# Patient Record
Sex: MALE | Race: WHITE | Employment: FULL TIME | URBAN - METROPOLITAN AREA
[De-identification: names, ages, dates, MRNs, and addresses within clinical notes are randomized per-mention and may not be internally consistent; named-entity substitution may affect disease eponyms.]

---

## 2017-02-10 ENCOUNTER — HOSPITAL ENCOUNTER (OUTPATIENT)
Dept: LAB | Age: 52
Discharge: HOME | End: 2017-02-10
Payer: COMMERCIAL

## 2017-02-10 ENCOUNTER — OFFICE VISIT (OUTPATIENT)
Dept: FAMILY MEDICINE CLINIC | Age: 52
End: 2017-02-10

## 2017-02-10 VITALS
BODY MASS INDEX: 32.39 KG/M2 | RESPIRATION RATE: 16 BRPM | DIASTOLIC BLOOD PRESSURE: 81 MMHG | SYSTOLIC BLOOD PRESSURE: 137 MMHG | HEIGHT: 65 IN | WEIGHT: 194.4 LBS | OXYGEN SATURATION: 99 % | TEMPERATURE: 98.2 F | HEART RATE: 62 BPM

## 2017-02-10 DIAGNOSIS — E78.2 MIXED HYPERLIPIDEMIA: Primary | ICD-10-CM

## 2017-02-10 DIAGNOSIS — E55.9 VITAMIN D DEFICIENCY: ICD-10-CM

## 2017-02-10 DIAGNOSIS — L24.9 IRRITANT CONTACT DERMATITIS, UNSPECIFIED TRIGGER: ICD-10-CM

## 2017-02-10 DIAGNOSIS — E78.2 MIXED HYPERLIPIDEMIA: ICD-10-CM

## 2017-02-10 LAB
CHOLEST SERPL-MCNC: 205 MG/DL
HDLC SERPL-MCNC: 39 MG/DL (ref 40–60)
HDLC SERPL: 5.3 {RATIO} (ref 0–5)
LDLC SERPL CALC-MCNC: 101.6 MG/DL (ref 0–100)
LIPID PROFILE,FLP: ABNORMAL
TRIGL SERPL-MCNC: 322 MG/DL (ref ?–150)
VLDLC SERPL CALC-MCNC: 64.4 MG/DL

## 2017-02-10 PROCEDURE — 80061 LIPID PANEL: CPT | Performed by: PHYSICIAN ASSISTANT

## 2017-02-10 PROCEDURE — 36415 COLL VENOUS BLD VENIPUNCTURE: CPT | Performed by: PHYSICIAN ASSISTANT

## 2017-02-10 PROCEDURE — 82306 VITAMIN D 25 HYDROXY: CPT | Performed by: PHYSICIAN ASSISTANT

## 2017-02-10 RX ORDER — CLOTRIMAZOLE AND BETAMETHASONE DIPROPIONATE 10; .64 MG/G; MG/G
CREAM TOPICAL
Qty: 15 G | Refills: 0 | Status: SHIPPED | OUTPATIENT
Start: 2017-02-10 | End: 2019-02-13 | Stop reason: SDUPTHER

## 2017-02-10 RX ORDER — ATORVASTATIN CALCIUM 20 MG/1
20 TABLET, FILM COATED ORAL DAILY
Qty: 30 TAB | Refills: 0 | Status: SHIPPED | OUTPATIENT
Start: 2017-02-10 | End: 2017-03-08 | Stop reason: SDUPTHER

## 2017-02-10 NOTE — PROGRESS NOTES
HISTORY OF PRESENT ILLNESS  Mario Borrero is a 54 y.o. male. HPI Comments: Pt comes in today for f/u dizziness, HLP, Vit D. He says that he has only had 3 episodes of dizziness in the past 3 months. He says that he has not been able to check his BS bc his machine is broken but he says that when he feels LH, he will take a glucose tab and feel better. He says that he still does not eat breakfast regualrly but is trying to snack more often. HE says that he has not been taking his Lipitor daily bc he forgets some days. He says that he has finished his Vit D. He also c/o rash. He says that he noticed it about a year ago but it will come and go. He says that it does not itch or hurt and he does not feel like it is spreading. He says that he has only noticed it on his right side upper body. He denies fever, chills, sweats, N/V, chest pain, SOB. Rash    Associated symptoms include itching. Medication Refill   Pertinent negatives include no chest pain, no abdominal pain, no headaches and no shortness of breath. Review of Systems   Constitutional: Negative for chills, diaphoresis, fever and malaise/fatigue. HENT: Negative for congestion, ear pain, hearing loss, nosebleeds and sore throat. Eyes: Negative for blurred vision, double vision, pain and redness. Respiratory: Negative for cough, hemoptysis, sputum production, shortness of breath and wheezing. Cardiovascular: Negative for chest pain, palpitations and leg swelling. Gastrointestinal: Negative for abdominal pain, blood in stool, constipation, diarrhea, nausea and vomiting. Genitourinary: Negative for dysuria and hematuria. Musculoskeletal: Negative for back pain, myalgias and neck pain. Skin: Positive for itching and rash. Neurological: Positive for dizziness. Negative for tingling, sensory change, seizures, loss of consciousness and headaches. Endo/Heme/Allergies: Does not bruise/bleed easily.    Psychiatric/Behavioral: Negative for depression, memory loss and substance abuse. The patient is not nervous/anxious. Past Medical History   Diagnosis Date    Exposure to chemical pollution     Migraine        Family History   Problem Relation Age of Onset    Hypertension Mother     Cancer Mother      Lymphatic cancer    No Known Problems Father     Heart Disease Maternal Grandmother     Heart Disease Maternal Grandfather     Cancer Maternal Grandfather      Lung Cancer       Past Surgical History   Procedure Laterality Date    Hx knee arthroscopy Left     Hx open reduction internal fixation Right      pins one surgery; pins removed on a separate surgery    Hx other surgical       Total top teeth removal and removable plate installed    Hx vasectomy      Hx wisdom teeth extraction         Social History     Social History    Marital status:      Spouse name: N/A    Number of children: N/A    Years of education: N/A     Occupational History    Not on file. Social History Main Topics    Smoking status: Never Smoker    Smokeless tobacco: Current User      Comment: Vape with 0.3 nicotine    Alcohol use No    Drug use: No    Sexual activity: Yes     Partners: Female     Other Topics Concern    Not on file     Social History Narrative       Current Outpatient Prescriptions   Medication Sig Dispense Refill    atorvastatin (LIPITOR) 20 mg tablet Take 1 Tab by mouth daily. 30 Tab 0    clotrimazole-betamethasone (LOTRISONE) topical cream Apply thin layer to affected area twice daily for 7 days. 15 g 0    Lancets misc Pt is checking his blood sugars 1-2 times a day. 50 Each 0    glucose blood VI test strips (ONETOUCH VERIO) strip Pt is checking his blood sugars 1-2 times a day. 50 Strip 0    traMADol (ULTRAM) 50 mg tablet Take 1 Tab by mouth every six (6) hours as needed for Pain. Max Daily Amount: 200 mg. 20 Tab 0    ibuprofen (MOTRIN) 200 mg tablet Take  by mouth. Take 2 to 3 tablets daily as needed.       omeprazole (PRILOSEC) 20 mg capsule Take 20 mg by mouth daily as needed.  triamcinolone (ARISTOCORT) 0.5 % topical cream Apply  to affected area two (2) times a day. use thin layer 15 g 0       Allergies   Allergen Reactions    Zyban [Bupropion Hcl] Rash and Swelling       Visit Vitals    /81 (BP 1 Location: Right arm, BP Patient Position: Sitting)    Pulse 62    Temp 98.2 °F (36.8 °C) (Oral)    Resp 16    Ht 5' 5\" (1.651 m)    Wt 194 lb 6.4 oz (88.2 kg)    SpO2 99%    BMI 32.35 kg/m2       Physical Exam   Constitutional: He is oriented to person, place, and time. He appears well-developed and well-nourished. No distress. HENT:   Head: Normocephalic and atraumatic. Right Ear: External ear normal.   Left Ear: External ear normal.   Nose: Nose normal.   Mouth/Throat: Oropharynx is clear and moist. No oropharyngeal exudate. Eyes: Conjunctivae are normal. Pupils are equal, round, and reactive to light. Right eye exhibits no discharge. Left eye exhibits no discharge. No scleral icterus. Neck: Normal range of motion. Neck supple. No tracheal deviation present. No thyromegaly present. Cardiovascular: Normal rate, regular rhythm and normal heart sounds. Exam reveals no gallop and no friction rub. No murmur heard. Pulmonary/Chest: Effort normal and breath sounds normal. No respiratory distress. He has no wheezes. He has no rales. Abdominal: Soft. He exhibits no distension. There is no tenderness. Musculoskeletal: Normal range of motion. He exhibits no edema or tenderness. Lymphadenopathy:     He has no cervical adenopathy. Neurological: He is alert and oriented to person, place, and time. Coordination normal.   Skin: Skin is warm and dry. Rash noted. He is not diaphoretic. There is erythema.    Mildly erythematous macules in 2-3 cm oval shapes scattered along right upper body/chest; about 5 different areas  Mildly erythematous area noticed on lower back near buttocks, well defined borders Psychiatric: He has a normal mood and affect. His behavior is normal. Judgment and thought content normal.       ASSESSMENT and PLAN  1. Mixed hyperlipidemia  - atorvastatin (LIPITOR) 20 mg tablet; Take 1 Tab by mouth daily. Dispense: 30 Tab; Refill: 0  - LIPID PANEL; Future    2. Vitamin D deficiency  - VITAMIN D, 25 HYDROXY; Future    3. Irritant contact dermatitis, unspecified trigger  - clotrimazole-betamethasone (LOTRISONE) topical cream; Apply thin layer to affected area twice daily for 7 days. Dispense: 15 g; Refill: 0    - will call with results  - follow up for CPE  - follow up if rash no better    Plan and reference materials were reviewed with the patient and the patient expressed understanding. Patient instructed that if symptoms/condition worsens or fails to resolve to come back to the office or go to the emergency room.     Gerhardt Morrow, [place]

## 2017-02-10 NOTE — PATIENT INSTRUCTIONS
Dermatitis: Care Instructions  Your Care Instructions  Dermatitis is the general name used for any rash or inflammation of the skin. Different kinds of dermatitis cause different kinds of rashes. Common causes of a rash include new medicines, plants (such as poison oak or poison ivy), heat, and stress. Certain illnesses can also cause a rash. An allergic reaction to something that touches your skin, such as latex, nickel, or poison ivy, is called contact dermatitis. Contact dermatitis may also be caused by something that irritates the skin, such as bleach, a chemical, or soap. These types of rashes cannot be spread from person to person. How long your rash will last depends on what caused it. Rashes may last a few days or months. Follow-up care is a key part of your treatment and safety. Be sure to make and go to all appointments, and call your doctor if you are having problems. It's also a good idea to know your test results and keep a list of the medicines you take. How can you care for yourself at home? · Do not scratch the rash. Cut your nails short, and file them smooth. Or wear gloves if this helps keep you from scratching. · Wash the area with water only. Pat dry. · Put cold, wet cloths on the rash to reduce itching. · Keep cool, and stay out of the sun. · Leave the rash open to the air as much as possible. · If the rash itches, use hydrocortisone cream. Follow the directions on the label. Calamine lotion may help for plant rashes. · Take an over-the-counter antihistamine, such as diphenhydramine (Benadryl) or loratadine (Claritin), to help calm the itching. Read and follow all instructions on the label. · If your doctor prescribed a cream, use it as directed. If your doctor prescribed medicine, take it exactly as directed. When should you call for help?   Call your doctor now or seek immediate medical care if:  · You have symptoms of infection, such as:  ¨ Increased pain, swelling, warmth, or redness. ¨ Red streaks leading from the area. ¨ Pus draining from the area. ¨ A fever. · You have joint pain along with the rash. Watch closely for changes in your health, and be sure to contact your doctor if:  · Your rash is changing or getting worse. · You are not getting better as expected. Where can you learn more? Go to http://tyesha-natalia.info/. Enter (63) 2013 7349 in the search box to learn more about \"Dermatitis: Care Instructions. \"  Current as of: May 10, 2016  Content Version: 11.1  © 6857-6392 [employer]. Care instructions adapted under license by [employer] (which disclaims liability or warranty for this information). If you have questions about a medical condition or this instruction, always ask your healthcare professional. [address] any warranty or liability for your use of this information.

## 2017-02-10 NOTE — PROGRESS NOTES
Samuel Eldridge is a 54 y.o. male presents to office for medication refill and vit D. Rash on right side . 1. Have you been to the ER, urgent care clinic or hospitalized since your last visit? no  2. Have you seen any other providers outside of [facility] since your last visit? no  3.  Have you had a Flu shot this year? no       Health Maintenance items with a due date reviewed with patient:  Health Maintenance Due   Topic Date Due    FOOT EXAM Q1  08/17/1977    MICROALBUMIN Q1  08/17/1977    EYE EXAM RETINAL OR DILATED Q1  08/17/1977    Pneumococcal 19-64 Medium Risk (1 of 1 - PPSV23) 08/17/1986    DTaP/Tdap/Td series (1 - Tdap) 08/17/1988    INFLUENZA AGE 9 TO ADULT  08/01/2016

## 2017-02-10 NOTE — MR AVS SNAPSHOT
Visit Information Date & Time Provider Department Dept. Phone Encounter #  
 2/10/2017  2:45 PM [place], [address] [facility] [phone] [id number] Follow-up Instructions Return if symptoms worsen or fail to improve. Upcoming Health Maintenance Date Due  
 FOOT EXAM Q1 8/17/1977 MICROALBUMIN Q1 8/17/1977 EYE EXAM RETINAL OR DILATED Q1 8/17/1977 Pneumococcal 19-64 Medium Risk (1 of 1 - PPSV23) 8/17/1986 DTaP/Tdap/Td series (1 - Tdap) 8/17/1988 INFLUENZA AGE 9 TO ADULT 8/1/2016 HEMOGLOBIN A1C Q6M 4/8/2017 LIPID PANEL Q1 10/8/2017 Allergies as of 2/10/2017  Review Complete On: 2/10/2017 By: SAROJ Odonnell Severity Noted Reaction Type Reactions Zyban [Bupropion Hcl]  08/16/2016    Rash, Swelling Current Immunizations  Never Reviewed No immunizations on file. Not reviewed this visit You Were Diagnosed With   
  
 Codes Comments Mixed hyperlipidemia    -  Primary ICD-10-CM: H75.0 ICD-9-CM: 272.2 Vitamin D deficiency     ICD-10-CM: E55.9 ICD-9-CM: 268.9 Irritant contact dermatitis, unspecified trigger     ICD-10-CM: L24.9 ICD-9-CM: 692.9 Vitals BP Pulse Temp Resp Height(growth percentile) Weight(growth percentile) 137/81 (BP 1 Location: Right arm, BP Patient Position: Sitting) 62 98.2 °F (36.8 °C) (Oral) 16 5' 5\" (1.651 m) 194 lb 6.4 oz (88.2 kg) SpO2 BMI Smoking Status 99% 32.35 kg/m2 Never Smoker Vitals History BMI and BSA Data Body Mass Index Body Surface Area  
 32.35 kg/m 2 2.01 m 2 Preferred Pharmacy Pharmacy Name Phone [facility], [address] [place] [address] [phone] Your Updated Medication List  
  
   
This list is accurate as of: 2/10/17  3:24 PM.  Always use your most recent med list.  
  
  
  
  
 atorvastatin 20 mg tablet Commonly known as:  LIPITOR Take 1 Tab by mouth daily. clotrimazole-betamethasone topical cream  
Commonly known as:  Patricia Roa Apply thin layer to affected area twice daily for 7 days. glucose blood VI test strips strip Commonly known as:  Leila Locket Pt is checking his blood sugars 1-2 times a day. ibuprofen 200 mg tablet Commonly known as:  MOTRIN Take  by mouth. Take 2 to 3 tablets daily as needed. Lancets Misc Pt is checking his blood sugars 1-2 times a day. PriLOSEC 20 mg capsule Generic drug:  omeprazole Take 20 mg by mouth daily as needed. traMADol 50 mg tablet Commonly known as:  ULTRAM  
Take 1 Tab by mouth every six (6) hours as needed for Pain. Max Daily Amount: 200 mg.  
  
 triamcinolone 0.5 % topical cream  
Commonly known as:  ARISTOCORT Apply  to affected area two (2) times a day. use thin layer Prescriptions Sent to Pharmacy Refills  
 atorvastatin (LIPITOR) 20 mg tablet 0 Sig: Take 1 Tab by mouth daily. Class: Normal  
 Pharmacy: [address] Ph #: [phone] Route: Oral  
 clotrimazole-betamethasone (LOTRISONE) topical cream 0 Sig: Apply thin layer to affected area twice daily for 7 days. Class: Normal  
 Pharmacy: [address] Ph #: [phone] Follow-up Instructions Return if symptoms worsen or fail to improve. To-Do List   
 02/10/2017 Lab:  LIPID PANEL   
  
 02/10/2017 Lab:  VITAMIN D, 25 HYDROXY Patient Instructions Dermatitis: Care Instructions Your Care Instructions Dermatitis is the general name used for any rash or inflammation of the skin. Different kinds of dermatitis cause different kinds of rashes. Common causes of a rash include new medicines, plants (such as poison oak or poison ivy), heat, and stress. Certain illnesses can also cause a rash. An allergic reaction to something that touches your skin, such as latex, nickel, or poison ivy, is called contact dermatitis. Contact dermatitis may also be caused by something that irritates the skin, such as bleach, a chemical, or soap. These types of rashes cannot be spread from person to person. How long your rash will last depends on what caused it. Rashes may last a few days or months. Follow-up care is a key part of your treatment and safety. Be sure to make and go to all appointments, and call your doctor if you are having problems. It's also a good idea to know your test results and keep a list of the medicines you take. How can you care for yourself at home? · Do not scratch the rash. Cut your nails short, and file them smooth. Or wear gloves if this helps keep you from scratching. · Wash the area with water only. Pat dry. · Put cold, wet cloths on the rash to reduce itching. · Keep cool, and stay out of the sun. · Leave the rash open to the air as much as possible. · If the rash itches, use hydrocortisone cream. Follow the directions on the label. Calamine lotion may help for plant rashes. · Take an over-the-counter antihistamine, such as diphenhydramine (Benadryl) or loratadine (Claritin), to help calm the itching. Read and follow all instructions on the label. · If your doctor prescribed a cream, use it as directed. If your doctor prescribed medicine, take it exactly as directed. When should you call for help? Call your doctor now or seek immediate medical care if: 
· You have symptoms of infection, such as: 
¨ Increased pain, swelling, warmth, or redness. ¨ Red streaks leading from the area. ¨ Pus draining from the area. ¨ A fever. · You have joint pain along with the rash. Watch closely for changes in your health, and be sure to contact your doctor if: 
· Your rash is changing or getting worse. · You are not getting better as expected. Where can you learn more? Go to http://tyesha-natalia.info/. Enter (81) 5997 5267 in the search box to learn more about \"Dermatitis: Care Instructions. \" Current as of: May 10, 2016 Content Version: 11.1 © 4778-7310 [employer]. Care instructions adapted under license by [employer] (which disclaims liability or warranty for this information). If you have questions about a medical condition or this instruction, always ask your healthcare professional. [address] any warranty or liability for your use of this information. Introducing [facility]! [facility] introduces [employer] patient portal. Now you can access parts of your medical record, email your doctor's office, and request medication refills online. 1. In your internet browser, go to [email or web address] 2. Click on the First Time User? Click Here link in the Sign In box. You will see the New Member Sign Up page. 3. Enter your [employer] Access Code exactly as it appears below. You will not need to use this code after youve completed the sign-up process. If you do not sign up before the expiration date, you must request a new code. · [employer] Access Code: [email or web address] Expires: 5/11/2017  3:23 PM 
 
4. Enter the last four digits of your Social Security Number (xxxx) and Date of Birth (mm/dd/yyyy) as indicated and click Submit. You will be taken to the next sign-up page. 5. Create a "[employer]t ID. This will be your [employer] login ID and cannot be changed, so think of one that is secure and easy to remember. 6. Create a [employer] [employer] password. You can change your password at any time. 7. Enter your Password Reset Question and Answer. This can be used at a later time if you forget your password. 8. Enter your e-mail address. You will receive e-mail notification when new information is available in [address] 9. Click Sign Up. You can now view and download portions of your medical record. 10. Click the Download Summary menu link to download a portable copy of your medical information. If you have questions, please visit the Frequently Asked Questions section of the Hyperpot website. Remember, [employer] is NOT to be used for urgent needs. For medical emergencies, dial 911. Now available from your iPhone and Android! Please provide this summary of care documentation to your next provider. Your primary care clinician is listed as Pamela Gallagher. If you have any questions after today's visit, please call [phone].

## 2017-02-11 LAB — 25(OH)D3 SERPL-MCNC: 29.7 NG/ML (ref 30–100)

## 2017-02-14 ENCOUNTER — TELEPHONE (OUTPATIENT)
Dept: FAMILY MEDICINE CLINIC | Age: 52
End: 2017-02-14

## 2017-02-14 NOTE — TELEPHONE ENCOUNTER
Spoke with pt's wife about results. I let her know his cholesterol is better. I also let her know that his Vit D is a little low and will need 1000 iu Vit D daily. Follow up as needed, The patient expressed understanding and had no further questions.

## 2017-03-08 ENCOUNTER — TELEPHONE (OUTPATIENT)
Dept: FAMILY MEDICINE CLINIC | Age: 52
End: 2017-03-08

## 2017-03-08 DIAGNOSIS — E78.2 MIXED HYPERLIPIDEMIA: ICD-10-CM

## 2017-03-08 RX ORDER — ATORVASTATIN CALCIUM 20 MG/1
20 TABLET, FILM COATED ORAL DAILY
Qty: 30 TAB | Refills: 5 | Status: SHIPPED | OUTPATIENT
Start: 2017-03-08 | End: 2018-06-02 | Stop reason: CLARIF

## 2017-03-30 ENCOUNTER — OFFICE VISIT (OUTPATIENT)
Dept: FAMILY MEDICINE CLINIC | Age: 52
End: 2017-03-30

## 2017-03-30 ENCOUNTER — HOSPITAL ENCOUNTER (OUTPATIENT)
Dept: LAB | Age: 52
Discharge: HOME | End: 2017-03-30
Payer: COMMERCIAL

## 2017-03-30 VITALS
DIASTOLIC BLOOD PRESSURE: 78 MMHG | RESPIRATION RATE: 16 BRPM | TEMPERATURE: 97.2 F | WEIGHT: 188.2 LBS | BODY MASS INDEX: 31.36 KG/M2 | SYSTOLIC BLOOD PRESSURE: 131 MMHG | HEART RATE: 77 BPM | OXYGEN SATURATION: 98 % | HEIGHT: 65 IN

## 2017-03-30 DIAGNOSIS — M25.532 LEFT WRIST PAIN: ICD-10-CM

## 2017-03-30 LAB
CHOLEST SERPL-MCNC: 165 MG/DL
GLUCOSE P FAST SERPL-MCNC: 99 MG/DL (ref 74–99)
HDLC SERPL-MCNC: 43 MG/DL (ref 40–60)
HDLC SERPL: 3.8 {RATIO} (ref 0–5)
LDLC SERPL CALC-MCNC: 100.6 MG/DL (ref 0–100)
LIPID PROFILE,FLP: ABNORMAL
TRIGL SERPL-MCNC: 107 MG/DL (ref ?–150)
VLDLC SERPL CALC-MCNC: 21.4 MG/DL

## 2017-03-30 PROCEDURE — 80061 LIPID PANEL: CPT | Performed by: FAMILY MEDICINE

## 2017-03-30 PROCEDURE — 36415 COLL VENOUS BLD VENIPUNCTURE: CPT | Performed by: FAMILY MEDICINE

## 2017-03-30 PROCEDURE — 82947 ASSAY GLUCOSE BLOOD QUANT: CPT | Performed by: FAMILY MEDICINE

## 2017-03-30 RX ORDER — TRAMADOL HYDROCHLORIDE 50 MG/1
50 TABLET ORAL
Qty: 30 TAB | Refills: 0 | Status: SHIPPED | OUTPATIENT
Start: 2017-03-30 | End: 2017-04-13 | Stop reason: ALTCHOICE

## 2017-03-30 NOTE — PROGRESS NOTES
Trever Mondragon is a 54 y.o.  male and presents with need for biometrics ans acute upper back pain for about one week. No progressive neurologic deficits or radiculopathy. Chief Complaint   Patient presents with    Physical     Subjective: Additional Concerns: none    Patient Active Problem List    Diagnosis Date Noted    Precordial pain 10/28/2016    Family history of premature CAD 10/28/2016    Mixed hyperlipidemia 10/26/2016     Current Outpatient Prescriptions   Medication Sig Dispense Refill    traMADol (ULTRAM) 50 mg tablet Take 1 Tab by mouth every six (6) hours as needed for Pain. Max Daily Amount: 200 mg. 30 Tab 0    atorvastatin (LIPITOR) 20 mg tablet Take 1 Tab by mouth daily. 30 Tab 5    clotrimazole-betamethasone (LOTRISONE) topical cream Apply thin layer to affected area twice daily for 7 days. 15 g 0    Lancets misc Pt is checking his blood sugars 1-2 times a day. 50 Each 0    glucose blood VI test strips (ONETOUCH VERIO) strip Pt is checking his blood sugars 1-2 times a day. 50 Strip 0    ibuprofen (MOTRIN) 200 mg tablet Take  by mouth. Take 2 to 3 tablets daily as needed.  omeprazole (PRILOSEC) 20 mg capsule Take 20 mg by mouth daily as needed.  triamcinolone (ARISTOCORT) 0.5 % topical cream Apply  to affected area two (2) times a day.  use thin layer 15 g 0     Allergies   Allergen Reactions    Zyban [Bupropion Hcl] Rash and Swelling     Past Medical History:   Diagnosis Date    Exposure to chemical pollution     Migraine      Past Surgical History:   Procedure Laterality Date    HX KNEE ARTHROSCOPY Left     HX OPEN REDUCTION INTERNAL FIXATION Right     pins one surgery; pins removed on a separate surgery    HX OTHER SURGICAL      Total top teeth removal and removable plate installed    HX VASECTOMY      HX WISDOM TEETH EXTRACTION       Family History   Problem Relation Age of Onset    Hypertension Mother     Cancer Mother      Lymphatic cancer    No Known Problems Father     Heart Disease Maternal Grandmother     Heart Disease Maternal Grandfather     Cancer Maternal Grandfather      Lung Cancer     Social History   Substance Use Topics    Smoking status: Never Smoker    Smokeless tobacco: Current User      Comment: Vape with 0.3 nicotine    Alcohol use No     ROS     General: negative for - chills, fatigue, fever, weight change  Endo: negative for - hot flashes, polydipsia/polyuria or temperature intolerance  Resp: negative for - cough, shortness of breath or wheezing  CV: negative for - chest pain, edema or palpitations  GI: negative for - abdominal pain, change in bowel habits, constipation, diarrhea or nausea/vomiting  MSK: negative for - joint pain, joint swelling, positive for low back muscle pain  Neuro: negative for - confusion, headaches, seizures or weakness    Objective:  Vitals:    03/30/17 0830   BP: 131/78   Pulse: 77   Resp: 16   Temp: 97.2 °F (36.2 °C)   TempSrc: Oral   SpO2: 98%   Weight: 188 lb 3.2 oz (85.4 kg)   Height: 5' 5\" (1.651 m)   PainSc:   4   PainLoc: Back     PE    Alert, well appearing, and in no distress, oriented to person, place, and time and normal appearing weight  Mental status - alert, oriented to person, place, and time, normal mood, behavior, speech, dress, motor activity, and thought processes  Chest - clear to auscultation, no wheezes, rales or rhonchi, symmetric air entry  Heart - normal rate, regular rhythm, normal S1, S2, no murmurs, rubs, clicks or gallops  Extremities - peripheral pulses normal, no pedal edema, no clubbing or cyanosis    [address] [facility] Outpatient Visit on 02/10/2017   Component Date Value Ref Range Status    Vitamin D 25-Hydroxy 02/10/2017 29.7* 30 - 100 ng/mL Final    Comment: (NOTE)  Deficiency               <20 ng/mL  Insufficiency          20-30 ng/mL  Sufficient             ng/mL  Possible toxicity       >100 ng/mL    The Method used is [facility] currently standardized to a   Center of Disease Control and Prevention (CDC) certified reference   [facility]. Samples containing fluorescein dye can produce falsely   elevated values when tested with the ADVIA Centaur Vitamin D Assay. It is recommended that results in the toxic range, >100 ng/mL, be   retested 72 hours post fluorescein exposure.  LIPID PROFILE 02/10/2017        Final    Cholesterol, total 02/10/2017 205* <200 MG/DL Final    Triglyceride 02/10/2017 322* <150 MG/DL Final    Comment: The drugs N-acetylcysteine (NAC) and  Metamiszole have been found to cause falsely  low results in this chemical assay. Please  be sure to submit blood samples obtained  BEFORE administration of either of these  drugs to assure correct results.  HDL Cholesterol 02/10/2017 39* 40 - 60 MG/DL Final    LDL, calculated 02/10/2017 101.6* 0 - 100 MG/DL Final    VLDL, calculated 02/10/2017 64.4  MG/DL Final    CHOL/HDL Ratio 02/10/2017 5.3* 0 - 5.0   Final   Hospital Outpatient Visit on 10/27/2016   Component Date Value Ref Range Status    Diagnosis 10/27/2016    Final                    Value:Negative by EKG criteria. Confirmed by Laruie Chew ([phone]) on 10/28/2016 6:02:14 PM      Test indication 10/27/2016 Chest Pain   Final    ECG Interp. Before Exercise 10/27/2016 Normal Sinus Rhythm   Final    Overall HR response to exercise 10/27/2016 appropriate   Final    Overall BP response to exercise 10/27/2016 normal resting BP - appropriate response   Final    Max. Systolic BP 58/71/4975 384  mmHg Final    Max. Diastolic BP 93/83/5689 80  mmHg Final    Max.  Heart rate 10/27/2016 173  BPM Final    Peak Ex METs 10/27/2016 11.7  METS Final    Protocol name 10/27/2016 74-[address] [facility] [address]              Final    Attending physician 10/27/2016 Kita Brosnon MD   Final   Hospital Outpatient Visit on 10/08/2016   Component Date Value Ref Range Status    WBC 10/08/2016 5.6  4.6 - 13.2 K/uL Final    RBC 10/08/2016 4.69* 4.70 - 5.50 M/uL Final    HGB 10/08/2016 14.8  13.0 - 16.0 g/dL Final    HCT 10/08/2016 45.1  36.0 - 48.0 % Final    MCV 10/08/2016 96.2  74.0 - 97.0 FL Final    MCH 10/08/2016 31.6  24.0 - 34.0 PG Final    MCHC 10/08/2016 32.8  31.0 - 37.0 g/dL Final    RDW 10/08/2016 13.1  11.6 - 14.5 % Final    PLATELET 39/59/3042 434  135 - 420 K/uL Final    MPV 10/08/2016 10.7  9.2 - 11.8 FL Final    NEUTROPHILS 10/08/2016 54  40 - 73 % Final    LYMPHOCYTES 10/08/2016 34  21 - 52 % Final    MONOCYTES 10/08/2016 9  3 - 10 % Final    EOSINOPHILS 10/08/2016 2  0 - 5 % Final    BASOPHILS 10/08/2016 1  0 - 2 % Final    ABS. NEUTROPHILS 10/08/2016 3.1  1.8 - 8.0 K/UL Final    ABS. LYMPHOCYTES 10/08/2016 1.9  0.9 - 3.6 K/UL Final    ABS. MONOCYTES 10/08/2016 0.5  0.05 - 1.2 K/UL Final    ABS. EOSINOPHILS 10/08/2016 0.1  0.0 - 0.4 K/UL Final    ABS. BASOPHILS 10/08/2016 0.1* 0.0 - 0.06 K/UL Final    DF 10/08/2016 AUTOMATED    Final    Sodium 10/08/2016 139  136 - 145 mmol/L Final    Potassium 10/08/2016 3.9  3.5 - 5.5 mmol/L Final    Chloride 10/08/2016 105  100 - 108 mmol/L Final    CO2 10/08/2016 26  21 - 32 mmol/L Final    Anion gap 10/08/2016 8  3.0 - 18 mmol/L Final    Glucose 10/08/2016 119* 74 - 99 mg/dL Final    BUN 10/08/2016 15  7.0 - 18 MG/DL Final    Creatinine 10/08/2016 0.98  0.6 - 1.3 MG/DL Final    BUN/Creatinine ratio 10/08/2016 15  12 - 20   Final    GFR est AA 10/08/2016 >60  >60 ml/min/1.73m2 Final    GFR est non-AA 10/08/2016 >60  >60 ml/min/1.73m2 Final    Comment: (NOTE)  Estimated GFR is calculated using the Modification of Diet in Renal   Disease (MDRD) Study equation, reported for both  Americans   (GFRAA) and non- Americans (GFRNA), and normalized to 1.73m2   body surface area. The physician must decide which value applies to   the patient. The MDRD study equation should only be used in   individuals age 25 or older.  It has not been validated for the   following: pregnant women, patients with serious comorbid conditions,   or on certain medications, or persons with extremes of body size,   muscle mass, or nutritional status.  Calcium 10/08/2016 8.2* 8.5 - 10.1 MG/DL Final    Bilirubin, total 10/08/2016 0.2  0.2 - 1.0 MG/DL Final    ALT (SGPT) 10/08/2016 249* 16 - 61 U/L Final    AST (SGOT) 10/08/2016 140* 15 - 37 U/L Final    Alk. phosphatase 10/08/2016 98  45 - 117 U/L Final    Protein, total 10/08/2016 7.6  6.4 - 8.2 g/dL Final    Albumin 10/08/2016 4.1  3.4 - 5.0 g/dL Final    Globulin 10/08/2016 3.5  2.0 - 4.0 g/dL Final    A-G Ratio 10/08/2016 1.2  0.8 - 1.7   Final    TSH 10/08/2016 0.74  0.36 - 3.74 uIU/mL Final    T4, Free 10/08/2016 1.0  0.7 - 1.5 NG/DL Final    LIPID PROFILE 10/08/2016        Final    Cholesterol, total 10/08/2016 230* <200 MG/DL Final    Triglyceride 10/08/2016 178* <150 MG/DL Final    HDL Cholesterol 10/08/2016 43  40 - 60 MG/DL Final    LDL, calculated 10/08/2016 151.4* 0 - 100 MG/DL Final    VLDL, calculated 10/08/2016 35.6  MG/DL Final    CHOL/HDL Ratio 10/08/2016 5.3* 0 - 5.0   Final    Hemoglobin A1c 10/08/2016 5.7* 4.2 - 5.6 % Final    Comment: (NOTE)  HbA1C Interpretive Ranges  <5.7              Normal  5.7 - 6.4         Consider Prediabetes  >6.5              Consider Diabetes      Est. average glucose 10/08/2016 117  mg/dL Final    Comment: (NOTE)  The eAG should be interpreted with patient characteristics in mind   since ethnicity, interindividual differences, red cell lifespan,   variation in rates of glycation, etc. may affect the validity of the   calculation.  Vitamin D 25-Hydroxy 10/08/2016 19.1* 30 - 100 ng/mL Final    Comment: (NOTE)  Deficiency               <20 ng/mL  Insufficiency          20-30 ng/mL  Sufficient             ng/mL  Possible toxicity       >100 ng/mL    The Method used is Siemens [employer] Centaur currently standardized to a   Center of Disease Control and Prevention (CDC) certified reference   [address] [facility] [address].  Samples containing fluorescein dye can produce falsely   elevated values when tested with the ADVIA Centaur Vitamin D Assay. It is recommended that results in the toxic range, >100 ng/mL, be   retested 72 hours post fluorescein exposure. TESTS  Results for orders placed or performed during the hospital encounter of 02/10/17   VITAMIN D, 25 HYDROXY   Result Value Ref Range    Vitamin D 25-Hydroxy 29.7 (L) 30 - 100 ng/mL   LIPID PANEL   Result Value Ref Range    LIPID PROFILE          Cholesterol, total 205 (H) <200 MG/DL    Triglyceride 322 (H) <150 MG/DL    HDL Cholesterol 39 (L) 40 - 60 MG/DL    LDL, calculated 101.6 (H) 0 - 100 MG/DL    VLDL, calculated 64.4 MG/DL    CHOL/HDL Ratio 5.3 (H) 0 - 5.0       Assessment/Plan: 1. Acute upper mid back pain - OTC patch like lidoderm 4% PRN   - traMADol (ULTRAM) 50 mg tablet; Take 1 Tab by mouth every six (6) hours as needed for Pain. Max Daily Amount: 200 mg. Dispense: 30 Tab; Refill: 0    2. Employee Health Biometrics - BP normal  - LIPID PANEL; Future  - GLUCOSE, FASTING ([employer] Only); Future    Lab review: no lab studies available for review at time of visit. We will call patient for results and further plan if any. I have discussed the diagnosis with the patient and the intended plan as seen in the above orders. The patient has received an after-visit summary and questions were answered concerning future plans. I have discussed medication side effects and warnings with the patient as well. I have reviewed the plan of care with the patient, accepted their input and they are in agreement with the treatment goals. F/U as needed.      Karon Edward MD

## 2017-03-30 NOTE — PROGRESS NOTES
Marsha Spence is a 54 y.o. male presents to office for [address] [facility] for work. 1. Have you been to the ER, urgent care clinic or hospitalized since your last visit? no  2. Have you seen any other providers outside of [facility] since your last visit? no  3. Have you had a Flu shot this year?  Pt declined      Health Maintenance items with a due date reviewed with patient:  Health Maintenance Due   Topic Date Due    FOOT EXAM Q1  08/17/1977    MICROALBUMIN Q1  08/17/1977    EYE EXAM RETINAL OR DILATED Q1  08/17/1977    Pneumococcal 19-64 Medium Risk (1 of 1 - PPSV23) 08/17/1986    DTaP/Tdap/Td series (1 - Tdap) 08/17/1988    INFLUENZA AGE 9 TO ADULT  08/01/2016    HEMOGLOBIN A1C Q6M  04/08/2017

## 2017-03-31 NOTE — PROGRESS NOTES
Pls report acceptable results for FLP and blood sugar for his employment biometric and send to patient or fax.

## 2017-04-01 NOTE — PATIENT INSTRUCTIONS
Musculoskeletal Pain: Care Instructions  Your Care Instructions  Different problems with the bones, muscles, nerves, ligaments, and tendons in the body can cause pain. One or more areas of your body may ache or burn. Or they may feel tired, stiff, or sore. The medical term for this type of pain is musculoskeletal pain. It can have many different causes. Sometimes the pain is caused by an injury such as a strain or sprain. Or you might have pain from using one part of your body in the same way over and over again. This is called overuse. In some cases, the cause of the pain is another health problem such as arthritis or fibromyalgia. The doctor will examine you and ask you questions about your health to help find the cause of your pain. Blood tests or imaging tests like an X-ray may also be helpful. But sometimes doctors can't find a cause of the pain. Treatment depends on your symptoms and the cause of the pain, if known. The doctor has checked you carefully, but problems can develop later. If you notice any problems or new symptoms, get medical treatment right away. Follow-up care is a key part of your treatment and safety. Be sure to make and go to all appointments, and call your doctor if you are having problems. It's also a good idea to know your test results and keep a list of the medicines you take. How can you care for yourself at home? · Rest until you feel better. · Do not do anything that makes the pain worse. Return to exercise gradually if you feel better and your doctor says it's okay. · Be safe with medicines. Read and follow all instructions on the label. ¨ If the doctor gave you a prescription medicine for pain, take it as prescribed. ¨ If you are not taking a prescription pain medicine, ask your doctor if you can take an over-the-counter medicine. · Put ice or a cold pack on the area for 10 to 20 minutes at a time to ease pain. Put a thin cloth between the ice and your skin.   When should you call for help? Call your doctor now or seek immediate medical care if:  · You have new pain, or your pain gets worse. · You have new symptoms such as a fever, a rash, or chills. Watch closely for changes in your health, and be sure to contact your doctor if:  · You do not get better as expected. Where can you learn more? Go to http://tyesha-natalia.info/. Enter C242 in the search box to learn more about \"Musculoskeletal Pain: Care Instructions. \"  Current as of: October 14, 2016  Content Version: 11.2  © 9688-1127 [employer]. Care instructions adapted under license by [employer] (which disclaims liability or warranty for this information). If you have questions about a medical condition or this instruction, always ask your healthcare professional. [address] any warranty or liability for your use of this information.

## 2017-04-06 ENCOUNTER — TELEPHONE (OUTPATIENT)
Dept: FAMILY MEDICINE CLINIC | Age: 52
End: 2017-04-06

## 2017-04-10 NOTE — TELEPHONE ENCOUNTER
Called and discussed lab levels with patient's wife. She states that the patient has been having \"sinking spells\" where he feels exhausted. She admits his eating habits are not great and that he eats his heaviest at night and not much during the day. He is very active at work and does not snack during the day. Advised her to encourage the patient to eat 6 small meals and snack on blood sugar sustaining foods. She verbalized understanding. Closing encounter.

## 2017-04-13 ENCOUNTER — OFFICE VISIT (OUTPATIENT)
Dept: FAMILY MEDICINE CLINIC | Age: 52
End: 2017-04-13

## 2017-04-13 VITALS
SYSTOLIC BLOOD PRESSURE: 122 MMHG | TEMPERATURE: 98.4 F | HEIGHT: 65 IN | HEART RATE: 68 BPM | OXYGEN SATURATION: 98 % | DIASTOLIC BLOOD PRESSURE: 71 MMHG | RESPIRATION RATE: 18 BRPM | BODY MASS INDEX: 30.54 KG/M2 | WEIGHT: 183.3 LBS

## 2017-04-13 DIAGNOSIS — M54.9 ACUTE UPPER BACK PAIN: Primary | ICD-10-CM

## 2017-04-13 DIAGNOSIS — E63.9 POOR EATING HABITS: ICD-10-CM

## 2017-04-13 DIAGNOSIS — K21.9 GASTROESOPHAGEAL REFLUX DISEASE WITHOUT ESOPHAGITIS: ICD-10-CM

## 2017-04-13 RX ORDER — OMEPRAZOLE 20 MG/1
20 CAPSULE, DELAYED RELEASE ORAL DAILY
Qty: 90 CAP | Refills: 1 | Status: SHIPPED | OUTPATIENT
Start: 2017-04-13 | End: 2018-05-09 | Stop reason: SDUPTHER

## 2017-04-13 RX ORDER — HYDROCODONE BITARTRATE AND ACETAMINOPHEN 5; 325 MG/1; MG/1
1 TABLET ORAL
Qty: 30 TAB | Refills: 0 | Status: SHIPPED | OUTPATIENT
Start: 2017-04-13 | End: 2018-06-02 | Stop reason: ALTCHOICE

## 2017-04-13 RX ORDER — CYCLOBENZAPRINE HCL 10 MG
10 TABLET ORAL
Qty: 60 TAB | Refills: 0 | Status: SHIPPED | OUTPATIENT
Start: 2017-04-13 | End: 2018-06-02 | Stop reason: ALTCHOICE

## 2017-04-13 RX ORDER — NAPROXEN 500 MG/1
500 TABLET ORAL 2 TIMES DAILY WITH MEALS
Qty: 60 TAB | Refills: 0 | Status: SHIPPED | OUTPATIENT
Start: 2017-04-13 | End: 2018-06-02 | Stop reason: SDUPTHER

## 2017-04-13 RX ORDER — OMEPRAZOLE 20 MG/1
20 CAPSULE, DELAYED RELEASE ORAL
Qty: 90 CAP | Refills: 1 | Status: SHIPPED | OUTPATIENT
Start: 2017-04-13 | End: 2017-04-13 | Stop reason: CLARIF

## 2017-04-13 NOTE — MR AVS SNAPSHOT
Visit Information Date & Time Provider Department Dept. Phone Encounter #  
 4/13/2017  8:15 AM José Miguel Leblanc, [address] [facility] [phone] [id number] Follow-up Instructions Return if symptoms worsen or fail to improve. Upcoming Health Maintenance Date Due DTaP/Tdap/Td series (1 - Tdap) 8/17/1988 Allergies as of 4/13/2017  Review Complete On: 4/13/2017 By: SAROJ Ramsey Severity Noted Reaction Type Reactions Zyban [Bupropion Hcl]  08/16/2016    Rash, Swelling Current Immunizations  Never Reviewed No immunizations on file. Not reviewed this visit You Were Diagnosed With   
  
 Codes Comments Acute upper back pain    -  Primary ICD-10-CM: M54.9 ICD-9-CM: 724.5, 338.19 Poor eating habits     ICD-10-CM: E63.9 ICD-9-CM: 269.9 Gastroesophageal reflux disease without esophagitis     ICD-10-CM: K21.9 ICD-9-CM: 530.81 Vitals BP Pulse Temp Resp Height(growth percentile) Weight(growth percentile) 122/71 (BP 1 Location: Right arm, BP Patient Position: Sitting) 68 98.4 °F (36.9 °C) (Oral) 18 5' 5\" (1.651 m) 183 lb 4.8 oz (83.1 kg) SpO2 BMI Smoking Status 98% 30.5 kg/m2 Never Smoker Vitals History BMI and BSA Data Body Mass Index Body Surface Area 30.5 kg/m 2 1.95 m 2 Preferred Pharmacy Pharmacy Name Phone Christine Malik [address], [address] [facility] [phone] Your Updated Medication List  
  
   
This list is accurate as of: 4/13/17  8:56 AM.  Always use your most recent med list.  
  
  
  
  
 atorvastatin 20 mg tablet Commonly known as:  LIPITOR Take 1 Tab by mouth daily. clotrimazole-betamethasone topical cream  
Commonly known as:  Pixie Money Apply thin layer to affected area twice daily for 7 days. cyclobenzaprine 10 mg tablet Commonly known as:  FLEXERIL  
 Take 1 Tab by mouth three (3) times daily as needed for Muscle Spasm(s). glucose blood VI test strips strip Commonly known as:  [place] Pt is checking his blood sugars 1-2 times a day. HYDROcodone-acetaminophen 5-325 mg per tablet Commonly known as:  Luann Cruise Take 1 Tab by mouth every six (6) hours as needed for Pain. Max Daily Amount: 4 Tabs. Lancets Misc Pt is checking his blood sugars 1-2 times a day. naproxen 500 mg tablet Commonly known as:  NAPROSYN Take 1 Tab by mouth two (2) times daily (with meals). omeprazole 20 mg capsule Commonly known as:  PriLOSEC Take 1 Cap by mouth daily as needed. triamcinolone 0.5 % topical cream  
Commonly known as:  ARISTOCORT Apply  to affected area two (2) times a day. use thin layer Prescriptions Printed Refills  
 omeprazole (PRILOSEC) 20 mg capsule 1 Sig: Take 1 Cap by mouth daily as needed. Class: Print Route: Oral  
 HYDROcodone-acetaminophen (NORCO) 5-325 mg per tablet 0 Sig: Take 1 Tab by mouth every six (6) hours as needed for Pain. Max Daily Amount: 4 Tabs. Class: Print Route: Oral  
  
Prescriptions Sent to Pharmacy Refills  
 cyclobenzaprine (FLEXERIL) 10 mg tablet 0 Sig: Take 1 Tab by mouth three (3) times daily as needed for Muscle Spasm(s). Class: Normal  
 Pharmacy: [address] Ph #: [phone] Route: Oral  
 naproxen (NAPROSYN) 500 mg tablet 0 Sig: Take 1 Tab by mouth two (2) times daily (with meals). Class: Normal  
 Pharmacy: [facility] [address], [address] Ph #: [phone] Route: Oral  
  
Follow-up Instructions Return if symptoms worsen or fail to improve. To-Do List   
 04/13/2017 Imaging:  XR SPINE THORAC 2 V Patient Instructions Rest back. After 2-3 days try some gentle stretching. Use heat/ice 3-4 times a day. Consider massage. Take 1/2 tab or 1 tab nightly of Flexeril for muscle relaxation. Use Naprosyn 500 mg twice daily with food for pain and inflammation for 1-2 weeks. Use Norco every 8 hours as needed for severe pain. Use OTC Miralax to help with constipation. I will call with xray results. Follow up in 2 weeks if no better. Please call with any questions or concerns. If symptoms persist or worsen, please come back or go to the Emergency Room. Back Pain: Care Instructions Your Care Instructions Back pain has many possible causes. It is often related to problems with muscles and ligaments of the back. It may also be related to problems with the nerves, discs, or bones of the back. Moving, lifting, standing, sitting, or sleeping in an awkward way can strain the back. Sometimes you don't notice the injury until later. Arthritis is another common cause of back pain. Although it may hurt a lot, back pain usually improves on its own within several weeks. Most people recover in 12 weeks or less. Using good home treatment and being careful not to stress your back can help you feel better sooner. Follow-up care is a key part of your treatment and safety. Be sure to make and go to all appointments, and call your doctor if you are having problems. Its also a good idea to know your test results and keep a list of the medicines you take. How can you care for yourself at home? · Sit or lie in positions that are most comfortable and reduce your pain. Try one of these positions when you lie down: ¨ Lie on your back with your knees bent and supported by large pillows. ¨ Lie on the floor with your legs on the seat of a sofa or chair. Cherylyn Gratiot on your side with your knees and hips bent and a pillow between your legs. ¨ Lie on your stomach if it does not make pain worse. · Do not sit up in bed, and avoid soft couches and twisted positions.  Bed rest can help relieve pain at first, but it delays healing. Avoid bed rest after the first day of back pain. · Change positions every 30 minutes. If you must sit for long periods of time, take breaks from sitting. Get up and walk around, or lie in a comfortable position. · Try using a heating pad on a low or medium setting for 15 to 20 minutes every 2 or 3 hours. Try a warm shower in place of one session with the heating pad. · You can also try an ice pack for 10 to 15 minutes every 2 to 3 hours. Put a thin cloth between the ice pack and your skin. · Take pain medicines exactly as directed. ¨ If the doctor gave you a prescription medicine for pain, take it as prescribed. ¨ If you are not taking a prescription pain medicine, ask your doctor if you can take an over-the-counter medicine. · Take short walks several times a day. You can start with 5 to 10 minutes, 3 or 4 times a day, and work up to longer walks. Walk on level surfaces and avoid hills and stairs until your back is better. · Return to work and other activities as soon as you can. Continued rest without activity is usually not good for your back. · To prevent future back pain, do exercises to stretch and strengthen your back and stomach. Learn how to use good posture, safe lifting techniques, and proper body mechanics. When should you call for help? Call your doctor now or seek immediate medical care if: 
· You have new or worsening numbness in your legs. · You have new or worsening weakness in your legs. (This could make it hard to stand up.) · You lose control of your bladder or bowels. Watch closely for changes in your health, and be sure to contact your doctor if: 
· Your pain gets worse. · You are not getting better after 2 weeks. Where can you learn more? Go to http://tyesha-natalia.info/. Enter P538 in the search box to learn more about \"Back Pain: Care Instructions. \" Current as of: May 23, 2016 Content Version: 11.2 © 3955-0629 [employer]. Care instructions adapted under license by [employer] (which disclaims liability or warranty for this information). If you have questions about a medical condition or this instruction, always ask your healthcare professional. [address] any warranty or liability for your use of this information. Food as Fuel: Care Instructions Your Care Instructions A healthy, balanced diet gives your body nutrients. Nutrients are like fuel for your body. They give you energy. And they keep your heart beating, your brain active, and your muscles working. They also help to build and strengthen bones, muscles, and other body tissues. Your body needs three major nutrients for energy. These are carbohydrate, protein, and fat. · Carbohydrate provides energy for your brain, muscles, heart, and lungs. It is found in bread, cereal, rice, pasta, fruits, vegetables, milk, yogurt, and sugar. · Protein provides energy and helps build and repair your body's cells. It is found in meat, poultry, fish, cooked dry beans, cheese, tofu and other soy products, nuts and seeds, and milk and milk products. · Fat provides energy, helps build the covering around nerves in your body, and helps make hormones. Fat is found in butter, margarine, oil, mayonnaise, salad dressing, and nuts. It is also found in most foods that come from animals, such as meat and milk products. Many foods also have fat added to them. Your body needs all three of these nutrients to be healthy. If you choose a good mix of foods, you can help your body get the right amounts of carbohydrate, protein, and fat. It can also keep you at a healthy weight. Follow-up care is a key part of your treatment and safety. Be sure to make and go to all appointments, and call your doctor if you are having problems.  It's also a good idea to know your test results and keep a list of the medicines you take. How can you care for yourself at home? Eat a balanced diet · Try to eat variety of healthy foods every day. These include: ¨ 5 to 8 ounces of bread, cereal, crackers, rice, or pasta. An ounce is about 1 slice of bread, ¾ cup of breakfast cereal, or ½ cup of cooked rice, cereal, or pasta. Choose whole-grain products for at least half of your grain servings. ¨ 2 to 3 cups of vegetables. Be sure to include: § Dark green vegetables such as broccoli and spinach. § Orange vegetables such as carrots and sweet potatoes. ¨ 1½ to 2 cups of fresh, frozen, or canned fruit. A banana, an orange, or a large apple equals 1 cup. ¨ 3 cups of nonfat or low-fat milk, yogurt, or other milk products. ¨ 5 to 6½ ounces of protein foods. These include chicken, fish, lean meat, beans, nuts, and seeds. One egg equals 1 ounce of meat, poultry, or fish. A ½ cup of cooked beans equals 2 ounces of protein. Stay fueled all day · Start your day with breakfast. If you don't have time to sit down for a bowl of cereal in the morning, try something that you can eat \"on the go. \" Try a piece of whole wheat bread with peanut butter or a container of yogurt with frozen berries mixed in. · Eat regularly scheduled meals and snacks. If you miss a meal, you may overeat at the next meal. Or you may choose a less healthy snack. · Drink enough water. (If you have kidney, heart, or liver disease and have to limit fluids, talk with your doctor before you increase your fluid intake.) Where can you learn more? Go to http://tyesha-natalia.info/. Enter V631 in the search box to learn more about \"Food as Fuel: Care Instructions. \" Current as of: July 26, 2016 Content Version: 11.2 © 7647-3639 [employer]. Care instructions adapted under license by [employer] (which disclaims liability or warranty for this information).  If you have questions about a medical condition or this instruction, always ask your healthcare professional. [address] any warranty or liability for your use of this information. Introducing [facility]! Ioana Osborn introduces [employer] patient portal. Now you can access parts of your medical record, email your doctor's office, and request medication refills online. 1. In your internet browser, go to [email or web address] [email or web address] 2. Click on the First Time User? Click Here link in the Sign In box. You will see the New Member Sign Up page. 3. Enter your [employer] Access Code exactly as it appears below. You will not need to use this code after youve completed the sign-up process. If you do not sign up before the expiration date, you must request a new code. · [employer] Access Code: [email or web address] Expires: 5/11/2017  4:23 PM 
 
4. Enter the last four digits of your Social Security Number (xxxx) and Date of Birth (mm/dd/yyyy) as indicated and click Submit. You will be taken to the next sign-up page. 5. Create a [employer] ID. This will be your [employer] login ID and cannot be changed, so think of one that is secure and easy to remember. 6. Create a [employer] password. You can change your password at any time. 7. Enter your Password Reset Question and Answer. This can be used at a later time if you forget your password. 8. Enter your e-mail address. You will receive e-mail notification when new information is available in [address] 9. Click Sign Up. You can now view and download portions of your medical record. 10. Click the Download Summary menu link to download a portable copy of your medical information. If you have questions, please visit the Frequently Asked Questions section of the [employer] website. Remember, [employer] is NOT to be used for urgent needs. For medical emergencies, dial 911. Now available from your iPhone and Android! Please provide this summary of care documentation to your next provider. Your primary care clinician is listed as Casandra Argueta. If you have any questions after today's visit, please call [phone].

## 2017-04-13 NOTE — PROGRESS NOTES
HISTORY OF PRESENT ILLNESS  Isela Turner is a 54 y.o. male. HPI Comments: Pt comes in today c/o upper back pain. He says that it started about 3-4 weeks ago when he was standing on his toes and reached over head with his left arm and heard a pop and felt immediate pain. He says that the pain has continually gotten worse over the past few weeks. He says that it feels like a burning pain and it radiates to both shoulders. He says that he tried heat, tens unit, and tramadol with no relief. He says that he needs a refill of his Prilosec. He says that he has not been eating at all during the day but will eat a big dinner. He says that his BS will go down to 60 sometimes and he will get LH. He denies fever, chills, sweats, N/V, chest pain, SOB, dizziness. Back Pain    Pertinent negatives include no chest pain, no fever, no headaches, no abdominal pain, no dysuria and no tingling. Review of Systems   Constitutional: Negative for chills, diaphoresis, fever and malaise/fatigue. HENT: Negative for congestion, ear pain, hearing loss, nosebleeds and sore throat. Eyes: Negative for blurred vision, double vision, pain and redness. Respiratory: Negative for cough, hemoptysis, sputum production, shortness of breath and wheezing. Cardiovascular: Negative for chest pain, palpitations and leg swelling. Gastrointestinal: Negative for abdominal pain, blood in stool, constipation, diarrhea, nausea and vomiting. Genitourinary: Negative for dysuria and hematuria. Musculoskeletal: Positive for back pain. Negative for myalgias and neck pain. Skin: Negative for rash. Neurological: Negative for dizziness, tingling, sensory change, seizures, loss of consciousness and headaches. Endo/Heme/Allergies: Does not bruise/bleed easily. Psychiatric/Behavioral: Negative for depression, memory loss and substance abuse. The patient is not nervous/anxious.       Past Medical History:   Diagnosis Date    Exposure to chemical pollution     Migraine        Family History   Problem Relation Age of Onset    Hypertension Mother     Cancer Mother      Lymphatic cancer    No Known Problems Father     Heart Disease Maternal Grandmother     Heart Disease Maternal Grandfather     Cancer Maternal Grandfather      Lung Cancer       Past Surgical History:   Procedure Laterality Date    HX KNEE ARTHROSCOPY Left     HX OPEN REDUCTION INTERNAL FIXATION Right     pins one surgery; pins removed on a separate surgery    HX OTHER SURGICAL      Total top teeth removal and removable plate installed    HX VASECTOMY      HX WISDOM TEETH EXTRACTION         Social History     Social History    Marital status:      Spouse name: N/A    Number of children: N/A    Years of education: N/A     Occupational History    Not on file. Social History Main Topics    Smoking status: Never Smoker    Smokeless tobacco: Current User      Comment: Vape with 0.3 nicotine    Alcohol use No    Drug use: No    Sexual activity: Yes     Partners: Female     Other Topics Concern    Not on file     Social History Narrative       Current Outpatient Prescriptions   Medication Sig Dispense Refill    cyclobenzaprine (FLEXERIL) 10 mg tablet Take 1 Tab by mouth three (3) times daily as needed for Muscle Spasm(s). 60 Tab 0    naproxen (NAPROSYN) 500 mg tablet Take 1 Tab by mouth two (2) times daily (with meals). 60 Tab 0    HYDROcodone-acetaminophen (NORCO) 5-325 mg per tablet Take 1 Tab by mouth every six (6) hours as needed for Pain. Max Daily Amount: 4 Tabs. 30 Tab 0    omeprazole (PRILOSEC) 20 mg capsule Take 1 Cap by mouth daily. 90 Cap 1    atorvastatin (LIPITOR) 20 mg tablet Take 1 Tab by mouth daily. 30 Tab 5    clotrimazole-betamethasone (LOTRISONE) topical cream Apply thin layer to affected area twice daily for 7 days. 15 g 0    Lancets misc Pt is checking his blood sugars 1-2 times a day.  50 Each 0    glucose blood VI test strips (ONETOUCH VERIO) strip Pt is checking his blood sugars 1-2 times a day. 50 Strip 0    triamcinolone (ARISTOCORT) 0.5 % topical cream Apply  to affected area two (2) times a day. use thin layer 15 g 0       Allergies   Allergen Reactions    Zyban [Bupropion Hcl] Rash and Swelling       Visit Vitals    /71 (BP 1 Location: Right arm, BP Patient Position: Sitting)    Pulse 68    Temp 98.4 °F (36.9 °C) (Oral)    Resp 18    Ht 5' 5\" (1.651 m)    Wt 183 lb 4.8 oz (83.1 kg)    SpO2 98%    BMI 30.5 kg/m2       Physical Exam   Constitutional: He is oriented to person, place, and time. He appears well-developed and well-nourished. No distress. HENT:   Head: Normocephalic and atraumatic. Right Ear: External ear normal.   Left Ear: External ear normal.   Nose: Nose normal.   Mouth/Throat: Oropharynx is clear and moist. No oropharyngeal exudate. Eyes: Conjunctivae are normal. Pupils are equal, round, and reactive to light. Right eye exhibits no discharge. Left eye exhibits no discharge. No scleral icterus. Neck: Normal range of motion. Neck supple. No tracheal deviation present. No thyromegaly present. Cardiovascular: Normal rate, regular rhythm and normal heart sounds. Exam reveals no gallop and no friction rub. No murmur heard. Pulmonary/Chest: Effort normal and breath sounds normal. No respiratory distress. He has no wheezes. He has no rales. Abdominal: Soft. He exhibits no distension. There is no tenderness. Musculoskeletal: He exhibits tenderness. He exhibits no edema. Lymphadenopathy:     He has no cervical adenopathy. Neurological: He is alert and oriented to person, place, and time. Coordination normal.   Skin: Skin is warm and dry. No rash noted. He is not diaphoretic. No erythema. Psychiatric: He has a normal mood and affect. His behavior is normal. Judgment and thought content normal.       ASSESSMENT and PLAN  1. Acute upper back pain  - cyclobenzaprine (FLEXERIL) 10 mg tablet;  Take 1 Tab by mouth three (3) times daily as needed for Muscle Spasm(s). Dispense: 60 Tab; Refill: 0  - naproxen (NAPROSYN) 500 mg tablet; Take 1 Tab by mouth two (2) times daily (with meals). Dispense: 60 Tab; Refill: 0  - XR SPINE THORAC 2 V; Future  - HYDROcodone-acetaminophen (NORCO) 5-325 mg per tablet; Take 1 Tab by mouth every six (6) hours as needed for Pain. Max Daily Amount: 4 Tabs. Dispense: 30 Tab; Refill: 0  - rest, gentle stretching, heat  - follow up in 2 weeks if no better    2. Poor eating habits  - asked pt to eat small consistent meals throughout the day just as his wife provides and take his medications regularly    3. Gastroesophageal reflux disease without esophagitis  - omeprazole (PRILOSEC) 20 mg capsule; Take 1 Cap by mouth daily. Dispense: 90 Cap; Refill: 1    Plan and reference materials were reviewed with the patient and the patient expressed understanding. Patient instructed that if symptoms/condition worsens or fails to resolve to come back to the office or go to the emergency room.     Noe Babcock

## 2017-04-13 NOTE — PROGRESS NOTES
Tg Hays is a 54 y.o. male presents to office for chronic upper back and shoulder pain. Pt states that the pain has increased since he was last seen. 1. Have you been to the ER, urgent care clinic or hospitalized since your last visit? no  2. Have you seen any other providers outside of [facility] since your last visit? no  3. Have you had a Flu shot this year?  Pt declined      Health Maintenance items with a due date reviewed with patient:  Health Maintenance Due   Topic Date Due    DTaP/Tdap/Td series (1 - Tdap) 08/17/1988

## 2017-04-13 NOTE — PATIENT INSTRUCTIONS
Rest back. After 2-3 days try some gentle stretching. Use heat/ice 3-4 times a day. Consider massage. Take 1/2 tab or 1 tab nightly of Flexeril for muscle relaxation. Use Naprosyn 500 mg twice daily with food for pain and inflammation for 1-2 weeks. Use Norco every 8 hours as needed for severe pain. Use OTC Miralax to help with constipation. I will call with xray results. Follow up in 2 weeks if no better. Please call with any questions or concerns. If symptoms persist or worsen, please come back or go to the Emergency Room. Back Pain: Care Instructions  Your Care Instructions    Back pain has many possible causes. It is often related to problems with muscles and ligaments of the back. It may also be related to problems with the nerves, discs, or bones of the back. Moving, lifting, standing, sitting, or sleeping in an awkward way can strain the back. Sometimes you don't notice the injury until later. Arthritis is another common cause of back pain. Although it may hurt a lot, back pain usually improves on its own within several weeks. Most people recover in 12 weeks or less. Using good home treatment and being careful not to stress your back can help you feel better sooner. Follow-up care is a key part of your treatment and safety. Be sure to make and go to all appointments, and call your doctor if you are having problems. Its also a good idea to know your test results and keep a list of the medicines you take. How can you care for yourself at home? · Sit or lie in positions that are most comfortable and reduce your pain. Try one of these positions when you lie down:  ¨ Lie on your back with your knees bent and supported by large pillows. ¨ Lie on the floor with your legs on the seat of a sofa or chair. Miguel Lita on your side with your knees and hips bent and a pillow between your legs. ¨ Lie on your stomach if it does not make pain worse.   · Do not sit up in bed, and avoid soft couches and twisted positions. Bed rest can help relieve pain at first, but it delays healing. Avoid bed rest after the first day of back pain. · Change positions every 30 minutes. If you must sit for long periods of time, take breaks from sitting. Get up and walk around, or lie in a comfortable position. · Try using a heating pad on a low or medium setting for 15 to 20 minutes every 2 or 3 hours. Try a warm shower in place of one session with the heating pad. · You can also try an ice pack for 10 to 15 minutes every 2 to 3 hours. Put a thin cloth between the ice pack and your skin. · Take pain medicines exactly as directed. ¨ If the doctor gave you a prescription medicine for pain, take it as prescribed. ¨ If you are not taking a prescription pain medicine, ask your doctor if you can take an over-the-counter medicine. · Take short walks several times a day. You can start with 5 to 10 minutes, 3 or 4 times a day, and work up to longer walks. Walk on level surfaces and avoid hills and stairs until your back is better. · Return to work and other activities as soon as you can. Continued rest without activity is usually not good for your back. · To prevent future back pain, do exercises to stretch and strengthen your back and stomach. Learn how to use good posture, safe lifting techniques, and proper body mechanics. When should you call for help? Call your doctor now or seek immediate medical care if:  · You have new or worsening numbness in your legs. · You have new or worsening weakness in your legs. (This could make it hard to stand up.)  · You lose control of your bladder or bowels. Watch closely for changes in your health, and be sure to contact your doctor if:  · Your pain gets worse. · You are not getting better after 2 weeks. Where can you learn more? Go to http://tyesha-natalia.info/. Enter Z244 in the search box to learn more about \"Back Pain: Care Instructions. \"  Current as of: May 23, 2016  Content Version: 11.2  © 9626-8566 [employer]. Care instructions adapted under license by [employer] (which disclaims liability or warranty for this information). If you have questions about a medical condition or this instruction, always ask your healthcare professional. [address] any warranty or liability for your use of this information. Food as Fuel: Care Instructions  Your Care Instructions  A healthy, balanced diet gives your body nutrients. Nutrients are like fuel for your body. They give you energy. And they keep your heart beating, your brain active, and your muscles working. They also help to build and strengthen bones, muscles, and other body tissues. Your body needs three major nutrients for energy. These are carbohydrate, protein, and fat. · Carbohydrate provides energy for your brain, muscles, heart, and lungs. It is found in bread, cereal, rice, pasta, fruits, vegetables, milk, yogurt, and sugar. · Protein provides energy and helps build and repair your body's cells. It is found in meat, poultry, fish, cooked dry beans, cheese, tofu and other soy products, nuts and seeds, and milk and milk products. · Fat provides energy, helps build the covering around nerves in your body, and helps make hormones. Fat is found in butter, margarine, oil, mayonnaise, salad dressing, and nuts. It is also found in most foods that come from animals, such as meat and milk products. Many foods also have fat added to them. Your body needs all three of these nutrients to be healthy. If you choose a good mix of foods, you can help your body get the right amounts of carbohydrate, protein, and fat. It can also keep you at a healthy weight. Follow-up care is a key part of your treatment and safety. Be sure to make and go to all appointments, and call your doctor if you are having problems.  It's also a good idea to know your test results and keep a list of the medicines you take. How can you care for yourself at home? Eat a balanced diet  · Try to eat variety of healthy foods every day. These include:  ¨ 5 to 8 ounces of bread, cereal, crackers, rice, or pasta. An ounce is about 1 slice of bread, ¾ cup of breakfast cereal, or ½ cup of cooked rice, cereal, or pasta. Choose whole-grain products for at least half of your grain servings. ¨ 2 to 3 cups of vegetables. Be sure to include:  § Dark green vegetables such as broccoli and spinach. § Orange vegetables such as carrots and sweet potatoes. ¨ 1½ to 2 cups of fresh, frozen, or canned fruit. A banana, an orange, or a large apple equals 1 cup. ¨ 3 cups of nonfat or low-fat milk, yogurt, or other milk products. ¨ 5 to 6½ ounces of protein foods. These include chicken, fish, lean meat, beans, nuts, and seeds. One egg equals 1 ounce of meat, poultry, or fish. A ½ cup of cooked beans equals 2 ounces of protein. Stay fueled all day  · Start your day with breakfast. If you don't have time to sit down for a bowl of cereal in the morning, try something that you can eat \"on the go. \" Try a piece of whole wheat bread with peanut butter or a container of yogurt with frozen berries mixed in. · Eat regularly scheduled meals and snacks. If you miss a meal, you may overeat at the next meal. Or you may choose a less healthy snack. · Drink enough water. (If you have kidney, heart, or liver disease and have to limit fluids, talk with your doctor before you increase your fluid intake.)  Where can you learn more? Go to http://tyesha-natalia.info/. Enter H790 in the search box to learn more about \"Food as Fuel: Care Instructions. \"  Current as of: July 26, 2016  Content Version: 11.2  © 6138-3764 [employer]. Care instructions adapted under license by [employer] (which disclaims liability or warranty for this information).  If you have questions about a medical condition or this instruction, always ask your healthcare professional. [place] [address] any warranty or liability for your use of this information.

## 2017-04-14 ENCOUNTER — TELEPHONE (OUTPATIENT)
Dept: FAMILY MEDICINE CLINIC | Age: 52
End: 2017-04-14

## 2017-04-14 NOTE — TELEPHONE ENCOUNTER
Patient is aware of XRAY results and showed verbal understanding. No further questions or concern at this time.

## 2017-05-25 ENCOUNTER — OFFICE VISIT (OUTPATIENT)
Dept: FAMILY MEDICINE CLINIC | Age: 52
End: 2017-05-25

## 2017-05-25 VITALS
BODY MASS INDEX: 30.92 KG/M2 | RESPIRATION RATE: 16 BRPM | SYSTOLIC BLOOD PRESSURE: 135 MMHG | HEART RATE: 81 BPM | TEMPERATURE: 96.6 F | WEIGHT: 185.6 LBS | OXYGEN SATURATION: 99 % | HEIGHT: 65 IN | DIASTOLIC BLOOD PRESSURE: 94 MMHG

## 2017-05-25 DIAGNOSIS — F32.89 OTHER DEPRESSION: Primary | ICD-10-CM

## 2017-05-25 PROBLEM — F32.A DEPRESSION: Status: ACTIVE | Noted: 2017-05-25

## 2017-05-25 RX ORDER — ESCITALOPRAM OXALATE 5 MG/1
5 TABLET ORAL DAILY
Qty: 90 TAB | Refills: 1 | Status: SHIPPED | OUTPATIENT
Start: 2017-05-25 | End: 2018-06-02 | Stop reason: SDUPTHER

## 2017-05-25 NOTE — PROGRESS NOTES
Breonna Kraft is a 54 y.o.  male and presents with worsening anxiety and depression. He was on low dose Lexapro before and it was effective. He does not want to see Psych at this time. No suicidal intention or ideation. Subjective: Additional Concerns: none    Patient Active Problem List    Diagnosis Date Noted    Precordial pain 10/28/2016    Family history of premature CAD 10/28/2016    Mixed hyperlipidemia 10/26/2016     Current Outpatient Prescriptions   Medication Sig Dispense Refill    cyclobenzaprine (FLEXERIL) 10 mg tablet Take 1 Tab by mouth three (3) times daily as needed for Muscle Spasm(s). 60 Tab 0    naproxen (NAPROSYN) 500 mg tablet Take 1 Tab by mouth two (2) times daily (with meals). 60 Tab 0    HYDROcodone-acetaminophen (NORCO) 5-325 mg per tablet Take 1 Tab by mouth every six (6) hours as needed for Pain. Max Daily Amount: 4 Tabs. 30 Tab 0    omeprazole (PRILOSEC) 20 mg capsule Take 1 Cap by mouth daily. 90 Cap 1    atorvastatin (LIPITOR) 20 mg tablet Take 1 Tab by mouth daily. 30 Tab 5    clotrimazole-betamethasone (LOTRISONE) topical cream Apply thin layer to affected area twice daily for 7 days. 15 g 0    Lancets misc Pt is checking his blood sugars 1-2 times a day. 50 Each 0    glucose blood VI test strips (ONETOUCH VERIO) strip Pt is checking his blood sugars 1-2 times a day. 50 Strip 0    triamcinolone (ARISTOCORT) 0.5 % topical cream Apply  to affected area two (2) times a day.  use thin layer 15 g 0     Allergies   Allergen Reactions    Zyban [Bupropion Hcl] Rash and Swelling     Past Medical History:   Diagnosis Date    Exposure to chemical pollution     Migraine      Past Surgical History:   Procedure Laterality Date    HX KNEE ARTHROSCOPY Left     HX OPEN REDUCTION INTERNAL FIXATION Right     pins one surgery; pins removed on a separate surgery    HX OTHER SURGICAL      Total top teeth removal and removable plate installed    HX VASECTOMY      HX WISDOM TEETH EXTRACTION       Family History   Problem Relation Age of Onset    Hypertension Mother     Cancer Mother      Lymphatic cancer    No Known Problems Father     Heart Disease Maternal Grandmother     Heart Disease Maternal Grandfather     Cancer Maternal Grandfather      Lung Cancer     Social History   Substance Use Topics    Smoking status: Never Smoker    Smokeless tobacco: Current User      Comment: Vape with 0.3 nicotine    Alcohol use No     ROS     General: negative for - chills, fatigue, fever, weight change  Psych: positive for - anxiety, depression,  No irritability or mood swings  Resp: negative for - cough, shortness of breath or wheezing  CV: negative for - chest pain, edema or palpitations    Objective:    PE    Alert, well appearing, and in no distress, oriented to person, place, and time and overweight  Mental status - alert, oriented to person, place, and time, normal mood, behavior, speech, dress, motor activity, and thought processes  Chest - clear to auscultation, no wheezes, rales or rhonchi, symmetric air entry  Heart - normal rate, regular rhythm, normal S1, S2, no murmurs, rubs, clicks or [address] Outpatient Visit on 03/30/2017   Component Date Value Ref Range Status    LIPID PROFILE 03/30/2017        Final    Cholesterol, total 03/30/2017 165  <200 MG/DL Final    Triglyceride 03/30/2017 107  <150 MG/DL Final    Comment: The drugs N-acetylcysteine (NAC) and  Metamiszole have been found to cause falsely  low results in this chemical assay. Please  be sure to submit blood samples obtained  BEFORE administration of either of these  drugs to assure correct results.       HDL Cholesterol 03/30/2017 43  40 - 60 MG/DL Final    LDL, calculated 03/30/2017 100.6* 0 - 100 MG/DL Final    VLDL, calculated 03/30/2017 21.4  MG/DL Final    CHOL/HDL Ratio 03/30/2017 3.8  0 - 5.0   Final    Glucose 03/30/2017 99  74 - 99 MG/DL Final    The ADA definition of diabetes is a fasting plasma glucOSE (FPG) of 126 mg/dL or higher. The ADA definition of impaired fasting glucose is a FPG of 100-125 mg/dL. Hospital Outpatient Visit on 02/10/2017   Component Date Value Ref Range Status    Vitamin D 25-Hydroxy 02/10/2017 29.7* 30 - 100 ng/mL Final    Comment: (NOTE)  Deficiency               <20 ng/mL  Insufficiency          20-30 ng/mL  Sufficient             ng/mL  Possible toxicity       >100 ng/mL    The Method used is Siemens Advia Centaur currently standardized to a   Center of Disease Control and Prevention (CDC) certified reference   [facility]. Samples containing fluorescein dye can produce falsely   elevated values when tested with the ADVIA Centaur Vitamin D Assay. It is recommended that results in the toxic range, >100 ng/mL, be   retested 72 hours post fluorescein exposure.  LIPID PROFILE 02/10/2017        Final    Cholesterol, total 02/10/2017 205* <200 MG/DL Final    Triglyceride 02/10/2017 322* <150 MG/DL Final    Comment: The drugs N-acetylcysteine (NAC) and  Metamiszole have been found to cause falsely  low results in this chemical assay. Please  be sure to submit blood samples obtained  BEFORE administration of either of these  drugs to assure correct results.       HDL Cholesterol 02/10/2017 39* 40 - 60 MG/DL Final    LDL, calculated 02/10/2017 101.6* 0 - 100 MG/DL Final    VLDL, calculated 02/10/2017 64.4  MG/DL Final    CHOL/HDL Ratio 02/10/2017 5.3* 0 - 5.0   Final       TESTS  Results for orders placed or performed during the hospital encounter of 03/30/17   LIPID PANEL   Result Value Ref Range    LIPID PROFILE          Cholesterol, total 165 <200 MG/DL    Triglyceride 107 <150 MG/DL    HDL Cholesterol 43 40 - 60 MG/DL    LDL, calculated 100.6 (H) 0 - 100 MG/DL    VLDL, calculated 21.4 MG/DL    CHOL/HDL Ratio 3.8 0 - 5.0     GLUCOSE, FASTING   Result Value Ref Range    Glucose 99 74 - 99 MG/DL     Assessment/Plan:     Depression with anxiety worsening - Restarted Lexapro 5mg po daily. Lab review: orders written for new lab studies as appropriate; see orders. I have discussed the diagnosis with the patient and the intended plan as seen in the above orders. The patient has received an after-visit summary and questions were answered concerning future plans. I have discussed medication side effects and warnings with the patient as well. I have reviewed the plan of care with the patient, accepted their input and they are in agreement with the treatment goals. F/U as needed. Routine 6 months.      Karon Edward MD

## 2017-05-25 NOTE — PROGRESS NOTES
Lissa Wallace is a 54 y.o. male presents to office for anxiety      1.  Have you been to the ER, urgent care clinic or hospitalized since your last visit? no          Health Maintenance items with a due date reviewed with patient:  Health Maintenance Due   Topic Date Due    DTaP/Tdap/Td series (1 - Tdap) 08/17/1988

## 2017-05-25 NOTE — PATIENT INSTRUCTIONS

## 2017-08-31 ENCOUNTER — TELEPHONE (OUTPATIENT)
Dept: FAMILY MEDICINE CLINIC | Age: 52
End: 2017-08-31

## 2017-08-31 NOTE — TELEPHONE ENCOUNTER
Patients wife called to inform patients reaction to Lipitor. No throat swelling or difficulty breathing noted at this time. Per Dr. Francie Salinsa patient is to stop Lipitor and to follow up next week to see if reaction has been subsided. No further questions or concern at this time.

## 2017-09-06 ENCOUNTER — OFFICE VISIT (OUTPATIENT)
Dept: FAMILY MEDICINE CLINIC | Age: 52
End: 2017-09-06

## 2017-09-06 VITALS
SYSTOLIC BLOOD PRESSURE: 115 MMHG | WEIGHT: 185.8 LBS | OXYGEN SATURATION: 97 % | TEMPERATURE: 97.3 F | BODY MASS INDEX: 30.96 KG/M2 | DIASTOLIC BLOOD PRESSURE: 71 MMHG | HEART RATE: 76 BPM | RESPIRATION RATE: 17 BRPM | HEIGHT: 65 IN

## 2017-09-06 DIAGNOSIS — R73.03 PREDIABETES: Primary | ICD-10-CM

## 2017-09-06 NOTE — PATIENT INSTRUCTIONS
Prediabetes: Care Instructions  Your Care Instructions  Prediabetes is a warning sign that you are at risk for getting type 2 diabetes. It means that your blood sugar is higher than it should be. The food you eat turns into sugar, which your body uses for energy. Normally, an organ called the pancreas makes insulin, which allows the sugar in your blood to get into your body's cells. But when your body can't use insulin the right way, the sugar doesn't move into cells. It stays in your blood instead. This is called insulin resistance. The buildup of sugar in the blood causes prediabetes. The good news is that lifestyle changes may help you get your blood sugar back to normal and help you avoid or delay diabetes. Follow-up care is a key part of your treatment and safety. Be sure to make and go to all appointments, and call your doctor if you are having problems. It's also a good idea to know your test results and keep a list of the medicines you take. How can you care for yourself at home? · Watch your weight. A healthy weight helps your body use insulin properly. · Limit the amount of calories, sweets, and unhealthy fat you eat. Ask your doctor if you should see a dietitian. A registered dietitian can help you create meal plans that fit your lifestyle. · Get at least 30 minutes of exercise on most days of the week. Exercise helps control your blood sugar. It also helps you maintain a healthy weight. Walking is a good choice. You also may want to do other activities, such as running, swimming, cycling, or playing tennis or team sports. · Do not smoke. Smoking can make prediabetes worse. If you need help quitting, talk to your doctor about stop-smoking programs and medicines. These can increase your chances of quitting for good. · If your doctor prescribed medicines, take them exactly as prescribed. Call your doctor if you think you are having a problem with your medicine.  You will get more details on the specific medicines your doctor prescribes. When should you call for help? Watch closely for changes in your health, and be sure to contact your doctor if:  · You have any symptoms of diabetes. These may include:  ¨ Being thirsty more often. ¨ Urinating more. ¨ Being hungrier. ¨ Losing weight. ¨ Being very tired. ¨ Having blurry vision. · You have a wound that will not heal.  · You have an infection that will not go away. · You have problems with your blood pressure. · You want more information about diabetes and how you can keep from getting it. Where can you learn more? Go to http://tyesha-natalia.info/. Enter I222 in the search box to learn more about \"Prediabetes: Care Instructions. \"  Current as of: March 13, 2017  Content Version: 11.3  © 7915-4454 [email or web address]. Care instructions adapted under license by [employer] (which disclaims liability or warranty for this information). If you have questions about a medical condition or this instruction, always ask your healthcare professional. [place] [address] any warranty or liability for your use of this information. High Cholesterol: Care Instructions  Your Care Instructions  Cholesterol is a type of fat in your blood. It is needed for many body functions, such as making new cells. Cholesterol is made by your body. It also comes from food you eat. High cholesterol means that you have too much of the fat in your blood. This raises your risk of a heart attack and stroke. LDL and HDL are part of your total cholesterol. LDL is the \"bad\" cholesterol. High LDL can raise your risk for heart disease, heart attack, and stroke. HDL is the \"good\" cholesterol. It helps clear bad cholesterol from the body. High HDL is linked with a lower risk of heart disease, heart attack, and stroke. Your cholesterol levels help your doctor find out your risk for having a heart attack or stroke.  You and your doctor can talk about whether you need to lower your risk and what treatment is best for you. A heart-healthy lifestyle along with medicines can help lower your cholesterol and your risk. The way you choose to lower your risk will depend on how high your risk is for heart attack and stroke. It will also depend on how you feel about taking medicines. Follow-up care is a key part of your treatment and safety. Be sure to make and go to all appointments, and call your doctor if you are having problems. It's also a good idea to know your test results and keep a list of the medicines you take. How can you care for yourself at home? · Eat a variety of foods every day. Good choices include fruits, vegetables, whole grains (like oatmeal), dried beans and peas, nuts and seeds, soy products (like tofu), and fat-free or low-fat dairy products. · Replace butter, margarine, and hydrogenated or partially hydrogenated oils with olive and canola oils. (Canola oil margarine without trans fat is fine.)  · Replace red meat with fish, poultry, and soy protein (like tofu). · Limit processed and packaged foods like chips, crackers, and cookies. · Bake, broil, or steam foods. Don't goetz them. · Be physically active. Get at least 30 minutes of exercise on most days of the week. Walking is a good choice. You also may want to do other activities, such as running, swimming, cycling, or playing tennis or team sports. · Stay at a healthy weight or lose weight by making the changes in eating and physical activity listed above. Losing just a small amount of weight, even 5 to 10 pounds, can reduce your risk for having a heart attack or stroke. · Do not smoke. When should you call for help? Watch closely for changes in your health, and be sure to contact your doctor if:  · You need help making lifestyle changes. · You have questions about your medicine. Where can you learn more? Go to http://tyesha-natalia.info/.   Enter L214 in the search box to learn more about \"High Cholesterol: Care Instructions. \"  Current as of: April 3, 2017  Content Version: 11.3  © 7682-5498 [employer]. Care instructions adapted under license by [employer] (which disclaims liability or warranty for this information). If you have questions about a medical condition or this instruction, always ask your healthcare professional. [place] [address] any warranty or liability for your use of this information.

## 2017-09-06 NOTE — PROGRESS NOTES
Octavia Maynard is a 50 y.o. male presents in office to with c/o possible react to lipitor. Health Maintenance Due   Topic Date Due    DTaP/Tdap/Td series (1 - Tdap) 08/17/1988    INFLUENZA AGE 9 TO ADULT  08/01/2017    FOBT Q 1 YEAR AGE 50-75  08/17/2017       1. Have you been to the ER, urgent care clinic since your last visit? Hospitalized since your last visit? No    2. Have you seen or consulted any other health care providers outside of the [facility] since your last visit? Include any pap smears or colon screening.  No

## 2018-03-08 ENCOUNTER — HOSPITAL ENCOUNTER (OUTPATIENT)
Dept: LAB | Age: 53
Discharge: HOME | End: 2018-03-08
Payer: COMMERCIAL

## 2018-03-08 ENCOUNTER — OFFICE VISIT (OUTPATIENT)
Dept: FAMILY MEDICINE CLINIC | Age: 53
End: 2018-03-08

## 2018-03-08 VITALS
OXYGEN SATURATION: 98 % | HEIGHT: 65 IN | WEIGHT: 190 LBS | TEMPERATURE: 98 F | BODY MASS INDEX: 31.65 KG/M2 | SYSTOLIC BLOOD PRESSURE: 120 MMHG | RESPIRATION RATE: 16 BRPM | DIASTOLIC BLOOD PRESSURE: 91 MMHG | HEART RATE: 79 BPM

## 2018-03-08 DIAGNOSIS — R10.9 ABDOMINAL PAIN, UNSPECIFIED ABDOMINAL LOCATION: ICD-10-CM

## 2018-03-08 DIAGNOSIS — R10.9 ABDOMINAL PAIN, UNSPECIFIED ABDOMINAL LOCATION: Primary | ICD-10-CM

## 2018-03-08 LAB
ANION GAP SERPL CALC-SCNC: 9 MMOL/L (ref 3–18)
BILIRUB UR QL STRIP: NEGATIVE
BUN SERPL-MCNC: 11 MG/DL (ref 7–18)
BUN/CREAT SERPL: 11 (ref 12–20)
CALCIUM SERPL-MCNC: 9.3 MG/DL (ref 8.5–10.1)
CHLORIDE SERPL-SCNC: 104 MMOL/L (ref 100–108)
CO2 SERPL-SCNC: 25 MMOL/L (ref 21–32)
CREAT SERPL-MCNC: 0.98 MG/DL (ref 0.6–1.3)
ERYTHROCYTE [DISTWIDTH] IN BLOOD BY AUTOMATED COUNT: 13.2 % (ref 11.6–14.5)
GLUCOSE SERPL-MCNC: 81 MG/DL (ref 74–99)
GLUCOSE UR-MCNC: NEGATIVE MG/DL
HCT VFR BLD AUTO: 46 % (ref 36–48)
HGB BLD-MCNC: 15.2 G/DL (ref 13–16)
KETONES P FAST UR STRIP-MCNC: NEGATIVE MG/DL
MCH RBC QN AUTO: 31.3 PG (ref 24–34)
MCHC RBC AUTO-ENTMCNC: 33 G/DL (ref 31–37)
MCV RBC AUTO: 94.7 FL (ref 74–97)
PH UR STRIP: 5.5 [PH] (ref 4.6–8)
PLATELET # BLD AUTO: 255 K/UL (ref 135–420)
PMV BLD AUTO: 10.7 FL (ref 9.2–11.8)
POTASSIUM SERPL-SCNC: 4 MMOL/L (ref 3.5–5.5)
PROT UR QL STRIP: NEGATIVE
RBC # BLD AUTO: 4.86 M/UL (ref 4.7–5.5)
SODIUM SERPL-SCNC: 138 MMOL/L (ref 136–145)
SP GR UR STRIP: 1.02 (ref 1–1.03)
UA UROBILINOGEN AMB POC: NORMAL (ref 0.2–1)
URINALYSIS CLARITY POC: CLEAR
URINALYSIS COLOR POC: YELLOW
URINE BLOOD POC: NEGATIVE
URINE LEUKOCYTES POC: NEGATIVE
URINE NITRITES POC: NEGATIVE
WBC # BLD AUTO: 9.8 K/UL (ref 4.6–13.2)

## 2018-03-08 PROCEDURE — 80048 BASIC METABOLIC PNL TOTAL CA: CPT | Performed by: PHYSICIAN ASSISTANT

## 2018-03-08 PROCEDURE — 36415 COLL VENOUS BLD VENIPUNCTURE: CPT | Performed by: PHYSICIAN ASSISTANT

## 2018-03-08 PROCEDURE — 85027 COMPLETE CBC AUTOMATED: CPT | Performed by: PHYSICIAN ASSISTANT

## 2018-03-08 RX ORDER — BISMUTH SUBSALICYLATE 262 MG
1 TABLET,CHEWABLE ORAL DAILY
COMMUNITY

## 2018-03-08 RX ORDER — ASPIRIN 81 MG/1
81 TABLET ORAL DAILY
COMMUNITY

## 2018-03-08 NOTE — PROGRESS NOTES
Ghazal Sagastume is a 50 y.o. male presents in office to be seen for abdominal pain. Health Maintenance Due   Topic Date Due    DTaP/Tdap/Td series (1 - Tdap) 08/17/1988    Influenza Age 5 to Adult  08/01/2017    FOBT Q 1 YEAR AGE 50-75  08/17/2017       1. Have you been to the ER, urgent care clinic since your last visit? Hospitalized since your last visit?no    2. Have you seen or consulted any other health care providers outside of the [address] [place] [address] since your last visit? Include any pap smears or colon screening.  no

## 2018-03-10 ENCOUNTER — TELEPHONE (OUTPATIENT)
Dept: FAMILY MEDICINE CLINIC | Age: 53
End: 2018-03-10

## 2018-03-10 NOTE — TELEPHONE ENCOUNTER
Reviewed patient's most recent lab results with him. Patient's previous complaints/symptoms have subsided and he is feeling much better with regular, uncomplicated bowel and bladder elimination. Encouraged to continue proper diet with increased fiber and H2O content. Reminded that if symptoms return or worsen to contact office or seek ER care as needed. Education provided to patient per SAROJ Thayer's recommendations.

## 2018-03-21 NOTE — PROGRESS NOTES
HISTORY OF PRESENT ILLNESS  Laverne Olivera is a 50 y.o. male who presents to center with abdominal pain. And having mild right upper abdominal pain intermittently yesterday and is continued today with it being more constant. He also has felt the pain radiates to his right flank as well as having some mild intermittent pain in the left upper quadrant and some discomfort in the lower midabdomen. Denies any nausea. Abdominal Pain   The history is provided by the patient. This is a new problem. The current episode started yesterday. The problem occurs hourly. Progression since onset: slightly worse today. Associated symptoms include abdominal pain. Pertinent negatives include no chest pain. Nothing aggravates the symptoms. He has tried nothing for the symptoms. Review of Systems   Constitutional: Negative for chills and fever. Cardiovascular: Negative for chest pain and palpitations. Gastrointestinal: Positive for abdominal pain. Negative for diarrhea and nausea. Genitourinary: Negative. Physical Exam   Constitutional: He appears well-developed and well-nourished. No distress. Neck: Neck supple. Cardiovascular: Normal rate and regular rhythm. Pulmonary/Chest: Effort normal and breath sounds normal.   Abdominal: Soft. He exhibits no distension. There is tenderness (mild tenderness of right upper quadrant. discomfort to palpation LUQ and lower mid abdomen. ). There is no rebound and no guarding. Lymphadenopathy:     He has no cervical adenopathy. ASSESSMENT and PLAN    ICD-10-CM ICD-9-CM    1. Abdominal pain, unspecified abdominal location R10.9 789.00 CBC W/O DIFF      AMB POC URINALYSIS DIP STICK AUTO W/O MICRO      METABOLIC PANEL, BASIC     Urinalysis showed no sign of infection. The exam was not remarkable for any reason why he is having the abdominal pain.   Patient instructed to take Tylenol and make sure he is adequately hydrated and rest.  If there is no improvement in 24 hours with is any worsening of symptoms in the meantime, advised to go to the emergency department. Labs drawn and pending. Patient verbalized understanding and agreed with plan.

## 2018-04-23 DIAGNOSIS — K21.9 GASTROESOPHAGEAL REFLUX DISEASE WITHOUT ESOPHAGITIS: ICD-10-CM

## 2018-04-23 NOTE — TELEPHONE ENCOUNTER
Pt calling to request medication refill of:  Requested Prescriptions     Pending Prescriptions Disp Refills    omeprazole (PRILOSEC) 20 mg capsule 90 Cap 1     Sig: Take 1 Cap by mouth daily. be sent to  [address], [address] [facility]  Pt has about 0 tabs remaining. Pts last appt was 3/8   Advised pt of 72 hour time frame for refill requests. Please advise.     Please call pt once refilled

## 2018-04-23 NOTE — TELEPHONE ENCOUNTER
SAROJ Thayer, please see refill request for patient, thank you! Requested Prescriptions     Pending Prescriptions Disp Refills    omeprazole (PRILOSEC) 20 mg capsule 90 Cap 1     Sig: Take 1 Cap by mouth daily.

## 2018-04-24 RX ORDER — OMEPRAZOLE 20 MG/1
20 CAPSULE, DELAYED RELEASE ORAL DAILY
Qty: 90 CAP | Refills: 1 | OUTPATIENT
Start: 2018-04-24

## 2018-04-27 NOTE — TELEPHONE ENCOUNTER
Pt's wife called back in regards to medication refill, spoke with SAROJ Thayer, pt needs to schedule a f/u appt and have labs done prior to medication being refilled. Advised pt's wife of this but she declined to schedule at the time because she needed to get her husbands schedule 1st.     Please be advised.

## 2018-05-09 DIAGNOSIS — K21.9 GASTROESOPHAGEAL REFLUX DISEASE WITHOUT ESOPHAGITIS: ICD-10-CM

## 2018-05-09 NOTE — LETTER
5/29/2018 9:44 AM 
 
Mr. Isaías Salazar [facility] [address], [place] [address] Dear Isaías Salazar, Our office has tried to reach you several times but unfortunately has been unsuccessful. Please give us a call at [phone] at your earliest convenience. Thank you Sincerely, SAROJ Foote

## 2018-05-09 NOTE — TELEPHONE ENCOUNTER
SAROJ Thyaer, please see refill request for patient, thank you! Requested Prescriptions     Pending Prescriptions Disp Refills    omeprazole (PRILOSEC) 20 mg capsule 90 Cap 1     Sig: Take 1 Cap by mouth daily.

## 2018-05-11 RX ORDER — OMEPRAZOLE 20 MG/1
20 CAPSULE, DELAYED RELEASE ORAL DAILY
Qty: 30 CAP | Refills: 0 | Status: SHIPPED | OUTPATIENT
Start: 2018-05-11 | End: 2018-06-02 | Stop reason: SDUPTHER

## 2018-05-11 NOTE — TELEPHONE ENCOUNTER
Please call patient to inform him that a 30 day supply was faxed. I asked him to return to center last time he requested the medication because he is due for a lipid panel. The last cholesterol check he had was last year. An A1c was ordered on 1 of his visits this year but he did not get the blood drawn for it. Therefore, before he is given further refills he needs to make an appointment for follow-up of hyperlipidemia.

## 2018-05-29 DIAGNOSIS — K21.9 GASTROESOPHAGEAL REFLUX DISEASE WITHOUT ESOPHAGITIS: ICD-10-CM

## 2018-05-29 RX ORDER — OMEPRAZOLE 20 MG/1
20 CAPSULE, DELAYED RELEASE ORAL DAILY
Qty: 90 CAP | Refills: 1 | Status: CANCELLED | OUTPATIENT
Start: 2018-05-29

## 2018-05-29 NOTE — TELEPHONE ENCOUNTER
Patient has been called again with no answer. Letter has been mailed to patient to please return call.

## 2018-06-02 ENCOUNTER — OFFICE VISIT (OUTPATIENT)
Dept: FAMILY MEDICINE CLINIC | Age: 53
End: 2018-06-02

## 2018-06-02 ENCOUNTER — HOSPITAL ENCOUNTER (OUTPATIENT)
Dept: LAB | Age: 53
Discharge: HOME | End: 2018-06-02
Payer: COMMERCIAL

## 2018-06-02 VITALS
SYSTOLIC BLOOD PRESSURE: 128 MMHG | TEMPERATURE: 96.7 F | BODY MASS INDEX: 30.96 KG/M2 | WEIGHT: 185.8 LBS | HEIGHT: 65 IN | OXYGEN SATURATION: 100 % | DIASTOLIC BLOOD PRESSURE: 72 MMHG | RESPIRATION RATE: 20 BRPM | HEART RATE: 64 BPM

## 2018-06-02 DIAGNOSIS — K21.9 GASTROESOPHAGEAL REFLUX DISEASE WITHOUT ESOPHAGITIS: ICD-10-CM

## 2018-06-02 DIAGNOSIS — F32.A DEPRESSION, UNSPECIFIED DEPRESSION TYPE: Primary | ICD-10-CM

## 2018-06-02 DIAGNOSIS — F41.9 ANXIETY: ICD-10-CM

## 2018-06-02 DIAGNOSIS — F32.89 OTHER DEPRESSION: ICD-10-CM

## 2018-06-02 DIAGNOSIS — M54.9 ACUTE UPPER BACK PAIN: ICD-10-CM

## 2018-06-02 DIAGNOSIS — E78.2 MIXED HYPERLIPIDEMIA: ICD-10-CM

## 2018-06-02 DIAGNOSIS — M25.532 LEFT WRIST PAIN: ICD-10-CM

## 2018-06-02 LAB
CHOLEST SERPL-MCNC: 214 MG/DL
HDLC SERPL-MCNC: 51 MG/DL (ref 40–60)
HDLC SERPL: 4.2 {RATIO} (ref 0–5)
LDLC SERPL CALC-MCNC: 128.4 MG/DL (ref 0–100)
LIPID PROFILE,FLP: ABNORMAL
TRIGL SERPL-MCNC: 173 MG/DL (ref ?–150)
VLDLC SERPL CALC-MCNC: 34.6 MG/DL

## 2018-06-02 PROCEDURE — 80061 LIPID PANEL: CPT | Performed by: PHYSICIAN ASSISTANT

## 2018-06-02 PROCEDURE — 36415 COLL VENOUS BLD VENIPUNCTURE: CPT | Performed by: PHYSICIAN ASSISTANT

## 2018-06-02 RX ORDER — ESCITALOPRAM OXALATE 5 MG/1
5 TABLET ORAL DAILY
Qty: 30 TAB | Refills: 2 | Status: SHIPPED | OUTPATIENT
Start: 2018-06-02 | End: 2018-11-17 | Stop reason: SDUPTHER

## 2018-06-02 RX ORDER — NAPROXEN 500 MG/1
500 TABLET ORAL 2 TIMES DAILY WITH MEALS
Qty: 60 TAB | Refills: 0 | Status: SHIPPED | OUTPATIENT
Start: 2018-06-02 | End: 2019-02-13 | Stop reason: SDUPTHER

## 2018-06-02 RX ORDER — OMEPRAZOLE 20 MG/1
20 CAPSULE, DELAYED RELEASE ORAL DAILY
Qty: 30 CAP | Refills: 0 | Status: SHIPPED | OUTPATIENT
Start: 2018-06-02 | End: 2018-07-18 | Stop reason: SDUPTHER

## 2018-06-02 NOTE — PROGRESS NOTES
HISTORY OF PRESENT ILLNESS  Linda Burr is a 50 y.o. male presenting to center for follow-up of anxiety and reflux. He ran out of his Lexapro to 3 days ago. Today he has been getting aggravated easily. He has been taking the Lexapro every other day and that seems to generally work well for his anxiety. He is requesting a prescription refill for Prilosec for his reflux. He does not take it every day but takes it a few times a week instead. GERD   The history is provided by the patient. This is a chronic problem. The problem occurs every several days. Progression since onset: improved. Pertinent negatives include no chest pain, no abdominal pain and no shortness of breath. Nothing aggravates the symptoms. The symptoms are relieved by medications. Anxiety   This is a chronic problem. The problem occurs daily. Progression since onset: managed with medication. Pertinent negatives include no chest pain, no abdominal pain and no shortness of breath. The symptoms are aggravated by stress. The symptoms are relieved by medications. Past Medical History:   Diagnosis Date    Exposure to chemical pollution     Hypercholesterolemia     Migraine      Current Outpatient Prescriptions on File Prior to Visit   Medication Sig Dispense Refill    multivitamin (ONE A DAY) tablet Take 1 Tab by mouth daily.  CHOLECALCIFEROL, VITAMIN D3, (VITAMIN D3 PO) Take  by mouth.  aspirin delayed-release 81 mg tablet Take  by mouth daily.  B.infantis-B.ani-B.long-B.bifi (PROBIOTIC 4X) 10-15 mg TbEC Take  by mouth.  clotrimazole-betamethasone (LOTRISONE) topical cream Apply thin layer to affected area twice daily for 7 days. 15 g 0    Lancets misc Pt is checking his blood sugars 1-2 times a day. 50 Each 0    glucose blood VI test strips (ONETOUCH VERIO) strip Pt is checking his blood sugars 1-2 times a day. 50 Strip 0    triamcinolone (ARISTOCORT) 0.5 % topical cream Apply  to affected area two (2) times a day. use thin layer 15 g 0     No current facility-administered medications on file prior to visit. Allergies   Allergen Reactions    Lipitor [Atorvastatin] Rash    Zyban [Bupropion Hcl] Rash and Swelling       Review of Systems   Constitutional: Negative for chills, fever and malaise/fatigue. Respiratory: Negative for shortness of breath. Cardiovascular: Negative for chest pain and palpitations. Gastrointestinal: Negative for abdominal pain, diarrhea and nausea. Psychiatric/Behavioral: Negative for substance abuse and suicidal ideas. The patient is nervous/anxious. Objective  Visit Vitals    /72 (BP 1 Location: Right arm, BP Patient Position: Sitting)    Pulse 64    Temp 96.7 °F (35.9 °C) (Oral)    Resp 20    Ht 5' 5\" (1.651 m)    Wt 185 lb 12.8 oz (84.3 kg)    SpO2 100%    BMI 30.92 kg/m2       Physical Exam   Constitutional: He is oriented to person, place, and time. He appears well-developed and well-nourished. No distress. Eyes: EOM are normal. Pupils are equal, round, and reactive to light. Cardiovascular: Normal rate, regular rhythm, normal heart sounds and intact distal pulses. Pulmonary/Chest: Effort normal and breath sounds normal.   Abdominal: Soft. He exhibits no distension. There is no tenderness. Neurological: He is alert and oriented to person, place, and time. Psychiatric: He has a normal mood and affect. His behavior is normal. Judgment and thought content normal.       ASSESSMENT and PLAN    ICD-10-CM ICD-9-CM    1. Depression, unspecified depression type F32.9 311    2. Gastroesophageal reflux disease without esophagitis K21.9 530.81 omeprazole (PRILOSEC) 20 mg capsule   3. Left wrist pain M25.532 719.43    4. Mixed hyperlipidemia E78.2 272.2 LIPID PANEL   5. Other depression F32.89 311 escitalopram oxalate (LEXAPRO) 5 mg tablet   6. Anxiety F41.9 300.00 escitalopram oxalate (LEXAPRO) 5 mg tablet   7.  Acute upper back pain M54.9 724.5 naproxen (NAPROSYN) 500 mg tablet     338.19      Patient given prescription refills as requested. He had elevated cholesterol about 6 months ago and he is supposed to get it rechecked so the labs are drawn today. Naprosyn given to help with occasional back pain and his recent mild left wrist pain. Return to center in 3 months for follow-up of anxiety and depression. She is answered and patient verbalized understanding and agree with plan.

## 2018-06-02 NOTE — MR AVS SNAPSHOT
[address] [place] [id number] 
[phone] Patient: Linda Burr MRN: [id number] :1965 Visit Information Date & Time Provider Department Dept. Phone Encounter #  
 2018  9:45 AM Yoseph Tello, [address] [facility] [phone] [id number] Upcoming Health Maintenance Date Due DTaP/Tdap/Td series (1 - Tdap) 1988 FOBT Q 1 YEAR AGE 50-75 2017 Influenza Age 5 to Adult 2018 Allergies as of 2018  Review Complete On: 3/8/2018 By: Estefani Garcia LPN Severity Noted Reaction Type Reactions Lipitor [Atorvastatin]  2017    Rash Zyban [Bupropion Hcl]  2016    Rash, Swelling Current Immunizations  Never Reviewed No immunizations on file. Not reviewed this visit You Were Diagnosed With   
  
 Codes Comments Depression, unspecified depression type    -  Primary ICD-10-CM: F32.9 ICD-9-CM: 128 Gastroesophageal reflux disease without esophagitis     ICD-10-CM: K21.9 ICD-9-CM: 530.81 Left wrist pain     ICD-10-CM: M25.532 ICD-9-CM: 719.43 Mixed hyperlipidemia     ICD-10-CM: E78.2 ICD-9-CM: 272.2 Other depression     ICD-10-CM: F32.89 ICD-9-CM: 612 Acute upper back pain     ICD-10-CM: M54.9 ICD-9-CM: 724.5, 338.19 Vitals BP Pulse Temp Resp Height(growth percentile) Weight(growth percentile) 128/72 (BP 1 Location: Right arm, BP Patient Position: Sitting) 64 96.7 °F (35.9 °C) (Oral) 20 5' 5\" (1.651 m) 185 lb 12.8 oz (84.3 kg) SpO2 BMI Smoking Status 100% 30.92 kg/m2 Never Smoker Vitals History BMI and BSA Data Body Mass Index Body Surface Area 30.92 kg/m 2 1.97 m 2 Preferred Pharmacy Pharmacy Name Phone [facility] [phone] Your Updated Medication List  
  
   
 This list is accurate as of 6/2/18 10:14 AM.  Always use your most recent med list.  
  
  
  
  
 aspirin delayed-release 81 mg tablet Take  by mouth daily. clotrimazole-betamethasone topical cream  
Commonly known as:  Lillette Mercury Apply thin layer to affected area twice daily for 7 days. escitalopram oxalate 5 mg tablet Commonly known as:  Zaida Narrow Take 1 Tab by mouth daily. Indications: ANXIETY WITH DEPRESSION  
  
 glucose blood VI test strips strip Commonly known as:  Abelardo Lento Pt is checking his blood sugars 1-2 times a day. Lancets Misc Pt is checking his blood sugars 1-2 times a day. multivitamin tablet Commonly known as:  ONE A DAY Take 1 Tab by mouth daily. naproxen 500 mg tablet Commonly known as:  NAPROSYN Take 1 Tab by mouth two (2) times daily (with meals). omeprazole 20 mg capsule Commonly known as:  PRILOSEC Take 1 Cap by mouth daily. PROBIOTIC 4X 10-15 mg Tbec Generic drug:  B.infantis-B.ani-B.long-B.bifi Take  by mouth.  
  
 triamcinolone 0.5 % topical cream  
Commonly known as:  ARISTOCORT Apply  to affected area two (2) times a day. use thin layer VITAMIN D3 PO Take  by mouth. Prescriptions Sent to Pharmacy Refills  
 omeprazole (PRILOSEC) 20 mg capsule 0 Sig: Take 1 Cap by mouth daily. Class: Normal  
 Pharmacy: [address] Ph #: [phone] Route: Oral  
 escitalopram oxalate (LEXAPRO) 5 mg tablet 2 Sig: Take 1 Tab by mouth daily. Indications: ANXIETY WITH DEPRESSION Class: Normal  
 Pharmacy: [address] Ph #: [phone] Route: Oral  
 naproxen (NAPROSYN) 500 mg tablet 0 Sig: Take 1 Tab by mouth two (2) times daily (with meals). Class: Normal  
 Pharmacy: [address] Ph #: [phone] Route: Oral  
  
To-Do List   
 06/02/2018 Lab:  LIPID PANEL Introducing [facility]! [facility] introduces BuyWithMe patient portal. Now you can access parts of your medical record, email your doctor's office, and request medication refills online. 1. In your internet browser, go to [email or web address] 2. Click on the First Time User? Click Here link in the Sign In box. You will see the New Member Sign Up page. 3. Enter your BuyWithMe Access Code exactly as it appears below. You will not need to use this code after youve completed the sign-up process. If you do not sign up before the expiration date, you must request a new code. · BuyWithMe Access Code: [email or web address] Expires: 6/19/2018 12:00 AM 
 
4. Enter the last four digits of your Social Security Number (xxxx) and Date of Birth (mm/dd/yyyy) as indicated and click Submit. You will be taken to the next sign-up page. 5. Create a BuyWithMe ID. This will be your BuyWithMe login ID and cannot be changed, so think of one that is secure and easy to remember. 6. Create a [employer]WithMe password. You can change your password at any time. 7. Enter your Password Reset Question and Answer. This can be used at a later time if you forget your password. 8. Enter your e-mail address. You will receive e-mail notification when new information is available in [address] 9. Click Sign Up. You can now view and download portions of your medical record. 10. Click the Download Summary menu link to download a portable copy of your medical information. If you have questions, please visit the Frequently Asked Questions section of the BuyWithMe website. Remember, BuyWithMe is NOT to be used for urgent needs. For medical emergencies, dial 911. Now available from your iPhone and Android! Please provide this summary of care documentation to your next provider. Your primary care clinician is listed as Aura Singh. If you have any questions after today's visit, please call [phone].

## 2018-06-02 NOTE — PROGRESS NOTES
Jaxon Peralta is a 50 y.o. male (: 1965) presenting to address:    Chief Complaint   Patient presents with    Medication Refill    Other     requesting labwork     GERD       Vitals:    18 0926   BP: 128/72   Pulse: 64   Resp: 20   Temp: 96.7 °F (35.9 °C)   TempSrc: Oral   SpO2: 100%   Weight: 185 lb 12.8 oz (84.3 kg)   Height: 5' 5\" (1.651 m)   PainSc:   0 - No pain       Hearing/Vision:   No exam data present    Learning Assessment:     Learning Assessment 2016   PRIMARY LEARNER Patient   HIGHEST LEVEL OF EDUCATION - PRIMARY LEARNER  GRADUATED HIGH SCHOOL OR GED   BARRIERS PRIMARY LEARNER COGNITIVE   PRIMARY LANGUAGE ENGLISH   LEARNER PREFERENCE PRIMARY DEMONSTRATION     LISTENING     PICTURES     READING     VIDEOS   ANSWERED BY Patient   RELATIONSHIP SELF     Depression Screening:     PHQ over the last two weeks 3/8/2018   Little interest or pleasure in doing things Not at all   Feeling down, depressed or hopeless Not at all   Total Score PHQ 2 0     Fall Risk Assessment:     Fall Risk Assessment, last 12 mths 3/8/2018   Able to walk? Yes   Fall in past 12 months? No     Abuse Screening:     Abuse Screening Questionnaire 3/8/2018   Do you ever feel afraid of your partner? N   Are you in a relationship with someone who physically or mentally threatens you? N   Is it safe for you to go home? Y     Coordination of Care Questionaire:   1. Have you been to the ER, urgent care clinic since your last visit? Hospitalized since your last visit? no    2. Have you seen or consulted any other health care providers outside of the [address] [place] [address] since your last visit? Include any pap smears or colon screening. no    Advanced Directive:   1. Do you have an Advanced Directive? No  2. Would you like information on Advanced Directives? no

## 2018-06-07 ENCOUNTER — TELEPHONE (OUTPATIENT)
Dept: FAMILY MEDICINE CLINIC | Age: 53
End: 2018-06-07

## 2018-06-18 NOTE — TELEPHONE ENCOUNTER
Spoke with patient's wife regarding lab results. Per Jannet Faye PA-c, patient instructed to focus on diet and exercise. Patient's wife also made aware of thera tears supplement option and advised to f/u for recheck in 6 to 8 weeks. Mrs Jaqueline Almanza verbalized understanding and had no further questions.

## 2018-07-18 ENCOUNTER — HOSPITAL ENCOUNTER (OUTPATIENT)
Dept: LAB | Age: 53
Discharge: HOME | End: 2018-07-18
Payer: COMMERCIAL

## 2018-07-18 ENCOUNTER — OFFICE VISIT (OUTPATIENT)
Dept: FAMILY MEDICINE CLINIC | Age: 53
End: 2018-07-18

## 2018-07-18 DIAGNOSIS — E16.2 HYPOGLYCEMIA: ICD-10-CM

## 2018-07-18 DIAGNOSIS — E16.2 HYPOGLYCEMIA: Primary | ICD-10-CM

## 2018-07-18 DIAGNOSIS — K21.9 GASTROESOPHAGEAL REFLUX DISEASE WITHOUT ESOPHAGITIS: ICD-10-CM

## 2018-07-18 LAB
ALBUMIN SERPL-MCNC: 4.1 G/DL (ref 3.4–5)
ALBUMIN/GLOB SERPL: 1.2 {RATIO} (ref 0.8–1.7)
ALP SERPL-CCNC: 77 U/L (ref 45–117)
ALT SERPL-CCNC: 99 U/L (ref 16–61)
ANION GAP SERPL CALC-SCNC: 9 MMOL/L (ref 3–18)
AST SERPL-CCNC: 47 U/L (ref 15–37)
BILIRUB SERPL-MCNC: 0.3 MG/DL (ref 0.2–1)
BUN SERPL-MCNC: 19 MG/DL (ref 7–18)
BUN/CREAT SERPL: 20 (ref 12–20)
CALCIUM SERPL-MCNC: 8.3 MG/DL (ref 8.5–10.1)
CHLORIDE SERPL-SCNC: 106 MMOL/L (ref 100–108)
CO2 SERPL-SCNC: 26 MMOL/L (ref 21–32)
CREAT SERPL-MCNC: 0.95 MG/DL (ref 0.6–1.3)
EST. AVERAGE GLUCOSE BLD GHB EST-MCNC: 111 MG/DL
GLOBULIN SER CALC-MCNC: 3.5 G/DL (ref 2–4)
GLUCOSE SERPL-MCNC: 103 MG/DL (ref 74–99)
HBA1C MFR BLD: 5.5 % (ref 4.2–5.6)
POTASSIUM SERPL-SCNC: 4.3 MMOL/L (ref 3.5–5.5)
PROT SERPL-MCNC: 7.6 G/DL (ref 6.4–8.2)
SODIUM SERPL-SCNC: 141 MMOL/L (ref 136–145)

## 2018-07-18 PROCEDURE — 80053 COMPREHEN METABOLIC PANEL: CPT | Performed by: FAMILY MEDICINE

## 2018-07-18 PROCEDURE — 83036 HEMOGLOBIN GLYCOSYLATED A1C: CPT | Performed by: FAMILY MEDICINE

## 2018-07-18 RX ORDER — OMEPRAZOLE 20 MG/1
20 CAPSULE, DELAYED RELEASE ORAL DAILY
Qty: 90 CAP | Refills: 1 | Status: SHIPPED | OUTPATIENT
Start: 2018-07-18 | End: 2019-02-13 | Stop reason: SDUPTHER

## 2018-07-18 NOTE — PROGRESS NOTES
Canelo Jeffery is a 50 y.o. male presents to office for     Medication list has been reviewed with Canelo Jeffery and updated as of today's date     Health Maintenance items with a due date reviewed with patient:  Health Maintenance Due   Topic Date Due    DTaP/Tdap/Td series (1 - Tdap) 08/17/1988    FOBT Q 1 YEAR AGE 50-75  08/17/2017

## 2018-07-18 NOTE — PROGRESS NOTES
Lacey Dudley is a 50 y.o.  male and presents with F/U for possible episodes of hypoglycemia and GERD. He apparently tends to miss lunch in a 10 hour shift without any snacks. No other symptoms. Subjective: Additional Concerns: none    Patient Active Problem List    Diagnosis Date Noted    Gastroesophageal reflux disease without esophagitis 06/02/2018    Depression 05/25/2017    Precordial pain 10/28/2016    Family history of premature CAD 10/28/2016    Mixed hyperlipidemia 10/26/2016     Current Outpatient Prescriptions   Medication Sig Dispense Refill    omeprazole (PRILOSEC) 20 mg capsule Take 1 Cap by mouth daily. 90 Cap 1    escitalopram oxalate (LEXAPRO) 5 mg tablet Take 1 Tab by mouth daily. Indications: ANXIETY WITH DEPRESSION 30 Tab 2    naproxen (NAPROSYN) 500 mg tablet Take 1 Tab by mouth two (2) times daily (with meals). 60 Tab 0    multivitamin (ONE A DAY) tablet Take 1 Tab by mouth daily.  CHOLECALCIFEROL, VITAMIN D3, (VITAMIN D3 PO) Take  by mouth.  aspirin delayed-release 81 mg tablet Take  by mouth daily.  B.infantis-B.ani-B.long-B.bifi (PROBIOTIC 4X) 10-15 mg TbEC Take  by mouth.  clotrimazole-betamethasone (LOTRISONE) topical cream Apply thin layer to affected area twice daily for 7 days. 15 g 0    Lancets misc Pt is checking his blood sugars 1-2 times a day. 50 Each 0    glucose blood VI test strips (ONETOUCH VERIO) strip Pt is checking his blood sugars 1-2 times a day. 50 Strip 0    triamcinolone (ARISTOCORT) 0.5 % topical cream Apply  to affected area two (2) times a day.  use thin layer 15 g 0     Allergies   Allergen Reactions    Lipitor [Atorvastatin] Rash    Zyban [Bupropion Hcl] Rash and Swelling     Past Medical History:   Diagnosis Date    Exposure to chemical pollution     Hypercholesterolemia     Migraine      Past Surgical History:   Procedure Laterality Date    HX KNEE ARTHROSCOPY Left     HX OPEN REDUCTION INTERNAL FIXATION Right pins one surgery; pins removed on a separate surgery    HX OTHER SURGICAL      Total top teeth removal and removable plate installed    HX VASECTOMY      HX WISDOM TEETH EXTRACTION       Family History   Problem Relation Age of Onset    Hypertension Mother     Cancer Mother      Lymphatic cancer    No Known Problems Father     Heart Disease Maternal Grandmother     Heart Disease Maternal Grandfather     Cancer Maternal Grandfather      Lung Cancer     Social History   Substance Use Topics    Smoking status: Never Smoker    Smokeless tobacco: Current User      Comment: Vape with 0.3 nicotine    Alcohol use No     ROS     General: negative for - chills, fatigue, fever, weight change  Endo: negative for - hot flashes, polydipsia/polyuria or temperature intolerance  Resp: negative for - cough, shortness of breath or wheezing  CV: negative for - chest pain, edema or palpitations  MSK: negative for - joint pain, joint swelling or muscle pain  Neuro: negative for - confusion, headaches, seizures or weakness    Objective:    PE    Alert, well appearing, and in no distress, oriented to person, place, and time and overweight  General appearance - alert, well appearing, and in no distress, oriented to person, place, and time and overweight  Mental status - alert, oriented to person, place, and time, normal mood, behavior, speech, dress, motor activity, and thought processes  Chest - clear to auscultation, no wheezes, rales or rhonchi, symmetric air entry  Heart - normal rate, regular rhythm, normal S1, S2, no murmurs, rubs, clicks or gallops  Extremities - peripheral pulses normal, no pedal edema, no clubbing or cyanosis    [address] [facility] Outpatient Visit on 06/02/2018   Component Date Value Ref Range Status    LIPID PROFILE 06/02/2018        Final    Cholesterol, total 06/02/2018 214* <200 MG/DL Final    Triglyceride 06/02/2018 173* <150 MG/DL Final    Comment: The drugs N-acetylcysteine (NAC) and  Metamiszole have been found to cause falsely  low results in this chemical assay. Please  be sure to submit blood samples obtained  BEFORE administration of either of these  drugs to assure correct results.  HDL Cholesterol 06/02/2018 51  40 - 60 MG/DL Final    LDL, calculated 06/02/2018 128.4* 0 - 100 MG/DL Final    VLDL, calculated 06/02/2018 34.6  MG/DL Final    CHOL/HDL Ratio 06/02/2018 4.2  0 - 5.0   Final   Hospital Outpatient Visit on 03/08/2018   Component Date Value Ref Range Status    WBC 03/08/2018 9.8  4.6 - 13.2 K/uL Final    RBC 03/08/2018 4.86  4.70 - 5.50 M/uL Final    HGB 03/08/2018 15.2  13.0 - 16.0 g/dL Final    HCT 03/08/2018 46.0  36.0 - 48.0 % Final    MCV 03/08/2018 94.7  74.0 - 97.0 FL Final    MCH 03/08/2018 31.3  24.0 - 34.0 PG Final    MCHC 03/08/2018 33.0  31.0 - 37.0 g/dL Final    RDW 03/08/2018 13.2  11.6 - 14.5 % Final    PLATELET 13/46/7913 618  135 - 420 K/uL Final    MPV 03/08/2018 10.7  9.2 - 11.8 FL Final    Sodium 03/08/2018 138  136 - 145 mmol/L Final    Potassium 03/08/2018 4.0  3.5 - 5.5 mmol/L Final    Chloride 03/08/2018 104  100 - 108 mmol/L Final    CO2 03/08/2018 25  21 - 32 mmol/L Final    Anion gap 03/08/2018 9  3.0 - 18 mmol/L Final    Glucose 03/08/2018 81  74 - 99 mg/dL Final    BUN 03/08/2018 11  7.0 - 18 MG/DL Final    Creatinine 03/08/2018 0.98  0.6 - 1.3 MG/DL Final    BUN/Creatinine ratio 03/08/2018 11* 12 - 20   Final    GFR est AA 03/08/2018 >60  >60 ml/min/1.73m2 Final    GFR est non-AA 03/08/2018 >60  >60 ml/min/1.73m2 Final    Comment: (NOTE)  Estimated GFR is calculated using the Modification of Diet in Renal   Disease (MDRD) Study equation, reported for both  Americans   (GFRAA) and non- Americans (GFRNA), and normalized to 1.73m2   body surface area. The physician must decide which value applies to   the patient. The MDRD study equation should only be used in   individuals age 25 or older.  It has not been validated for the   following: pregnant women, patients with serious comorbid conditions,   or on certain medications, or persons with extremes of body size,   muscle mass, or nutritional status.  Calcium 03/08/2018 9.3  8.5 - 10.1 MG/DL Final   Office Visit on 03/08/2018   Component Date Value Ref Range Status    Color (UA POC) 03/08/2018 Yellow   Final    Clarity (UA POC) 03/08/2018 Clear   Final    Glucose (UA POC) 03/08/2018 Negative  Negative Final    Bilirubin (UA POC) 03/08/2018 Negative  Negative Final    Ketones (UA POC) 03/08/2018 Negative  Negative Final    Specific gravity (UA POC) 03/08/2018 1.020  1.001 - 1.035 Final    Blood (UA POC) 03/08/2018 Negative  Negative Final    pH (UA POC) 03/08/2018 5.5  4.6 - 8.0 Final    Protein (UA POC) 03/08/2018 Negative  Negative Final    Urobilinogen (UA POC) 03/08/2018 0.2 mg/dL  0.2 - 1 Final    Nitrites (UA POC) 03/08/2018 Negative  Negative Final    Leukocyte esterase (UA POC) 03/08/2018 Negative  Negative Final       TESTS  Results for orders placed or performed during the hospital encounter of 06/02/18   LIPID PANEL   Result Value Ref Range    LIPID PROFILE          Cholesterol, total 214 (H) <200 MG/DL    Triglyceride 173 (H) <150 MG/DL    HDL Cholesterol 51 40 - 60 MG/DL    LDL, calculated 128.4 (H) 0 - 100 MG/DL    VLDL, calculated 34.6 MG/DL    CHOL/HDL Ratio 4.2 0 - 5.0       Assessment/Plan:      Hypoglycemia - possible missing meals on a chronic basis shahid lunch. Patient encouraged not to miss meals and maybe have healthy  Snacks in between. - METABOLIC PANEL, COMPREHENSIVE; Future  - HEMOGLOBIN A1C WITH EAG; Future    2. Gastroesophageal reflux disease without esophagitis  - omeprazole (PRILOSEC) 20 mg capsule; Take 1 Cap by mouth daily. Dispense: 90 Cap; Refill: 1    Lab review: orders written for new lab studies as appropriate; see orders.      I have discussed the diagnosis with the patient and the intended plan as seen in the above orders. The patient has received an after-visit summary and questions were answered concerning future plans. I have discussed medication side effects and warnings with the patient as well. I have reviewed the plan of care with the patient, accepted their input and they are in agreement with the treatment goals. F/U as needed.     Kenny Holcomb MD

## 2018-07-19 ENCOUNTER — TELEPHONE (OUTPATIENT)
Dept: FAMILY MEDICINE CLINIC | Age: 53
End: 2018-07-19

## 2018-07-19 NOTE — PROGRESS NOTES
Pls congratulate patient for reducing his A1C to normal. Keep up the good work. He has slight elevation in liver enzymes and I advice for patient to avoid alcohol. Recheck in 3 -6 months.

## 2018-07-19 NOTE — PATIENT INSTRUCTIONS

## 2018-08-15 ENCOUNTER — PATIENT OUTREACH (OUTPATIENT)
Dept: FAMILY MEDICINE CLINIC | Age: 53
End: 2018-08-15

## 2018-09-12 ENCOUNTER — PATIENT OUTREACH (OUTPATIENT)
Dept: FAMILY MEDICINE CLINIC | Age: 53
End: 2018-09-12

## 2018-09-21 ENCOUNTER — PATIENT OUTREACH (OUTPATIENT)
Dept: FAMILY MEDICINE CLINIC | Age: 53
End: 2018-09-21

## 2018-11-17 DIAGNOSIS — F41.9 ANXIETY: ICD-10-CM

## 2018-11-17 DIAGNOSIS — F32.89 OTHER DEPRESSION: ICD-10-CM

## 2018-11-17 RX ORDER — ESCITALOPRAM OXALATE 5 MG/1
5 TABLET ORAL DAILY
Qty: 30 TAB | Refills: 0 | Status: SHIPPED | OUTPATIENT
Start: 2018-11-17 | End: 2019-01-16 | Stop reason: SDUPTHER

## 2018-11-17 NOTE — TELEPHONE ENCOUNTER
Pt calling to request med refill of:  Requested Prescriptions     Pending Prescriptions Disp Refills    escitalopram oxalate (LEXAPRO) 5 mg tablet 30 Tab 2     Sig: Take 1 Tab by mouth daily. Be sent to [facility] #[id number]    Pt has 1 tabs remaining     Pts last appt was 7/1818 & no f/u scheduled    Pt advised of 72 hour time frame and advised this refill may require an appointment. Please advise.

## 2018-11-17 NOTE — TELEPHONE ENCOUNTER
Please call pt to inform him that a script for 30 days an no refill was sent. He is to follow up in one month for anxiety and his elevated liver enzymes, prior to med running out.

## 2019-01-08 ENCOUNTER — PATIENT OUTREACH (OUTPATIENT)
Dept: FAMILY MEDICINE CLINIC | Age: 54
End: 2019-01-08

## 2019-01-15 ENCOUNTER — PATIENT OUTREACH (OUTPATIENT)
Dept: FAMILY MEDICINE CLINIC | Age: 54
End: 2019-01-15

## 2019-01-16 DIAGNOSIS — F41.9 ANXIETY: ICD-10-CM

## 2019-01-16 DIAGNOSIS — F32.89 OTHER DEPRESSION: ICD-10-CM

## 2019-01-16 NOTE — TELEPHONE ENCOUNTER
Pt calling to request med refill of:  Requested Prescriptions     Pending Prescriptions Disp Refills    escitalopram oxalate (LEXAPRO) 5 mg tablet 30 Tab 0     Sig: Take 1 Tab by mouth daily. Be sent to [facility] [id number]    Pt has 0 tabs remaining     Pts last appt was 7/18/18 (abhilash)  & next appt kely for 2/13/18 Marene Siemens)    Pt advised of 72 hour time frame. Please advise.

## 2019-01-17 RX ORDER — ESCITALOPRAM OXALATE 5 MG/1
5 TABLET ORAL DAILY
Qty: 30 TAB | Refills: 0 | Status: SHIPPED | OUTPATIENT
Start: 2019-01-17 | End: 2019-02-13 | Stop reason: SDUPTHER

## 2019-02-13 ENCOUNTER — OFFICE VISIT (OUTPATIENT)
Dept: FAMILY MEDICINE CLINIC | Age: 54
End: 2019-02-13

## 2019-02-13 ENCOUNTER — HOSPITAL ENCOUNTER (OUTPATIENT)
Dept: LAB | Age: 54
Discharge: HOME | End: 2019-02-13
Payer: COMMERCIAL

## 2019-02-13 VITALS
BODY MASS INDEX: 33.55 KG/M2 | HEART RATE: 64 BPM | WEIGHT: 201.4 LBS | TEMPERATURE: 98.5 F | HEIGHT: 65 IN | DIASTOLIC BLOOD PRESSURE: 87 MMHG | OXYGEN SATURATION: 96 % | SYSTOLIC BLOOD PRESSURE: 146 MMHG

## 2019-02-13 DIAGNOSIS — Z12.11 SCREENING FOR COLON CANCER: ICD-10-CM

## 2019-02-13 DIAGNOSIS — R73.09 ABNORMAL BLOOD SUGAR: ICD-10-CM

## 2019-02-13 DIAGNOSIS — Z82.49 FAMILY HISTORY OF PREMATURE CAD: ICD-10-CM

## 2019-02-13 DIAGNOSIS — E78.2 MIXED HYPERLIPIDEMIA: ICD-10-CM

## 2019-02-13 DIAGNOSIS — L24.9 IRRITANT CONTACT DERMATITIS, UNSPECIFIED TRIGGER: ICD-10-CM

## 2019-02-13 DIAGNOSIS — Z00.00 ANNUAL PHYSICAL EXAM: Primary | ICD-10-CM

## 2019-02-13 DIAGNOSIS — Z12.5 SCREENING FOR PROSTATE CANCER: ICD-10-CM

## 2019-02-13 DIAGNOSIS — Z00.00 ANNUAL PHYSICAL EXAM: ICD-10-CM

## 2019-02-13 DIAGNOSIS — K21.9 GASTROESOPHAGEAL REFLUX DISEASE WITHOUT ESOPHAGITIS: ICD-10-CM

## 2019-02-13 DIAGNOSIS — E66.09 CLASS 1 OBESITY DUE TO EXCESS CALORIES WITH SERIOUS COMORBIDITY AND BODY MASS INDEX (BMI) OF 33.0 TO 33.9 IN ADULT: ICD-10-CM

## 2019-02-13 DIAGNOSIS — F32.89 OTHER DEPRESSION: ICD-10-CM

## 2019-02-13 DIAGNOSIS — M17.0 OSTEOARTHRITIS OF BOTH KNEES, UNSPECIFIED OSTEOARTHRITIS TYPE: ICD-10-CM

## 2019-02-13 DIAGNOSIS — F41.9 ANXIETY: ICD-10-CM

## 2019-02-13 DIAGNOSIS — M54.9 ACUTE UPPER BACK PAIN: ICD-10-CM

## 2019-02-13 LAB
ALBUMIN SERPL-MCNC: 4.3 G/DL (ref 3.4–5)
ALBUMIN/GLOB SERPL: 1.2 {RATIO} (ref 0.8–1.7)
ALP SERPL-CCNC: 93 U/L (ref 45–117)
ALT SERPL-CCNC: 185 U/L (ref 16–61)
ANION GAP SERPL CALC-SCNC: 7 MMOL/L (ref 3–18)
APPEARANCE UR: CLEAR
AST SERPL-CCNC: 83 U/L (ref 15–37)
BACTERIA URNS QL MICRO: NEGATIVE /HPF
BASOPHILS # BLD: 0.1 K/UL (ref 0–0.1)
BASOPHILS NFR BLD: 1 % (ref 0–2)
BILIRUB SERPL-MCNC: 0.6 MG/DL (ref 0.2–1)
BILIRUB UR QL: NEGATIVE
BUN SERPL-MCNC: 17 MG/DL (ref 7–18)
BUN/CREAT SERPL: 17 (ref 12–20)
CALCIUM SERPL-MCNC: 8.7 MG/DL (ref 8.5–10.1)
CHLORIDE SERPL-SCNC: 105 MMOL/L (ref 100–108)
CHOLEST SERPL-MCNC: 225 MG/DL
CO2 SERPL-SCNC: 27 MMOL/L (ref 21–32)
COLOR UR: YELLOW
CREAT SERPL-MCNC: 1.01 MG/DL (ref 0.6–1.3)
DIFFERENTIAL METHOD BLD: ABNORMAL
EOSINOPHIL # BLD: 0.1 K/UL (ref 0–0.4)
EOSINOPHIL NFR BLD: 1 % (ref 0–5)
EPITH CASTS URNS QL MICRO: NORMAL /LPF (ref 0–5)
ERYTHROCYTE [DISTWIDTH] IN BLOOD BY AUTOMATED COUNT: 13.6 % (ref 11.6–14.5)
GLOBULIN SER CALC-MCNC: 3.6 G/DL (ref 2–4)
GLUCOSE SERPL-MCNC: 95 MG/DL (ref 74–99)
GLUCOSE UR STRIP.AUTO-MCNC: NEGATIVE MG/DL
HBA1C MFR BLD: 6.1 % (ref 4.2–5.6)
HCT VFR BLD AUTO: 48.3 % (ref 36–48)
HDLC SERPL-MCNC: 45 MG/DL (ref 40–60)
HDLC SERPL: 5 {RATIO} (ref 0–5)
HGB BLD-MCNC: 15.5 G/DL (ref 13–16)
HGB UR QL STRIP: NEGATIVE
KETONES UR QL STRIP.AUTO: NEGATIVE MG/DL
LDLC SERPL CALC-MCNC: 143.2 MG/DL (ref 0–100)
LEUKOCYTE ESTERASE UR QL STRIP.AUTO: NEGATIVE
LIPID PROFILE,FLP: ABNORMAL
LYMPHOCYTES # BLD: 2.3 K/UL (ref 0.9–3.6)
LYMPHOCYTES NFR BLD: 37 % (ref 21–52)
MCH RBC QN AUTO: 31.2 PG (ref 24–34)
MCHC RBC AUTO-ENTMCNC: 32.1 G/DL (ref 31–37)
MCV RBC AUTO: 97.2 FL (ref 74–97)
MONOCYTES # BLD: 0.5 K/UL (ref 0.05–1.2)
MONOCYTES NFR BLD: 8 % (ref 3–10)
NEUTS SEG # BLD: 3.4 K/UL (ref 1.8–8)
NEUTS SEG NFR BLD: 53 % (ref 40–73)
NITRITE UR QL STRIP.AUTO: NEGATIVE
PH UR STRIP: 6 [PH] (ref 5–8)
PLATELET # BLD AUTO: 282 K/UL (ref 135–420)
PMV BLD AUTO: 11 FL (ref 9.2–11.8)
POTASSIUM SERPL-SCNC: 4.4 MMOL/L (ref 3.5–5.5)
PROT SERPL-MCNC: 7.9 G/DL (ref 6.4–8.2)
PROT UR STRIP-MCNC: NEGATIVE MG/DL
RBC # BLD AUTO: 4.97 M/UL (ref 4.7–5.5)
RBC #/AREA URNS HPF: 0 /HPF (ref 0–5)
SODIUM SERPL-SCNC: 139 MMOL/L (ref 136–145)
SP GR UR REFRACTOMETRY: 1.02 (ref 1–1.03)
TRIGL SERPL-MCNC: 184 MG/DL (ref ?–150)
UROBILINOGEN UR QL STRIP.AUTO: 0.2 EU/DL (ref 0.2–1)
VLDLC SERPL CALC-MCNC: 36.8 MG/DL
WBC # BLD AUTO: 6.3 K/UL (ref 4.6–13.2)
WBC URNS QL MICRO: 0 /HPF (ref 0–4)

## 2019-02-13 PROCEDURE — 83036 HEMOGLOBIN GLYCOSYLATED A1C: CPT

## 2019-02-13 PROCEDURE — 80053 COMPREHEN METABOLIC PANEL: CPT

## 2019-02-13 PROCEDURE — 81001 URINALYSIS AUTO W/SCOPE: CPT

## 2019-02-13 PROCEDURE — 80061 LIPID PANEL: CPT

## 2019-02-13 PROCEDURE — 85025 COMPLETE CBC W/AUTO DIFF WBC: CPT

## 2019-02-13 PROCEDURE — 36415 COLL VENOUS BLD VENIPUNCTURE: CPT

## 2019-02-13 RX ORDER — ESCITALOPRAM OXALATE 5 MG/1
5 TABLET ORAL DAILY
Qty: 90 TAB | Refills: 1 | Status: SHIPPED | OUTPATIENT
Start: 2019-02-13 | End: 2019-05-15 | Stop reason: SDUPTHER

## 2019-02-13 RX ORDER — NAPROXEN 500 MG/1
500 TABLET ORAL 2 TIMES DAILY WITH MEALS
Qty: 60 TAB | Refills: 0 | Status: SHIPPED | OUTPATIENT
Start: 2019-02-13 | End: 2019-10-25

## 2019-02-13 RX ORDER — OMEPRAZOLE 20 MG/1
20 CAPSULE, DELAYED RELEASE ORAL DAILY
Qty: 90 CAP | Refills: 1 | Status: SHIPPED | OUTPATIENT
Start: 2019-02-13 | End: 2019-05-15 | Stop reason: SDUPTHER

## 2019-02-13 RX ORDER — CLOTRIMAZOLE AND BETAMETHASONE DIPROPIONATE 10; .64 MG/G; MG/G
CREAM TOPICAL
Qty: 15 G | Refills: 0 | Status: SHIPPED | OUTPATIENT
Start: 2019-02-13 | End: 2019-10-25

## 2019-02-13 NOTE — PROGRESS NOTES
Isaías Salazar     Chief Complaint   Patient presents with    Physical    Medication Refill     Vitals:    02/13/19 0816   BP: 146/87   Pulse: 64   Temp: 98.5 °F (36.9 °C)   TempSrc: Oral   SpO2: 96%   Weight: 201 lb 6.4 oz (91.4 kg)   Height: 5' 5\" (1.651 m)   PainSc:   4   PainLoc: Abdomen         HPI: Patient is here along with his spouse to establish care with a new PCP, that all PCP has left the clinic he need to do his annual physical examination as well asEstablishing care and refill medications      Having increasing knee pain in the last few month, he has history of arthritis and meniscal repair surgery in the past but he has not followed up with orthopedics in few years he would like to follow-up with Ortho. Patient has been reviewed, naproxen 500 mg as needed for knee pain and chronic wrist pain I strongly advised against NSAID use due to side effects in the kidney as well as risk of gastric ulcers and gastritis. Patient stated he use it only as needed for pain about twice a week.   Patient has history of    GERD he has not had any EGD done in the past also his be due for colonoscopy screening as well, as prostate cancer screening    Past Medical History:   Diagnosis Date    Exposure to chemical pollution     Hypercholesterolemia     Migraine      Past Surgical History:   Procedure Laterality Date    HX KNEE ARTHROSCOPY Left     HX OPEN REDUCTION INTERNAL FIXATION Right     pins one surgery; pins removed on a separate surgery    HX OTHER SURGICAL      Total top teeth removal and removable plate installed    HX VASECTOMY      HX WISDOM TEETH EXTRACTION       Social History     Tobacco Use    Smoking status: Never Smoker    Smokeless tobacco: Current User    Tobacco comment: Vape with 0.3 nicotine   Substance Use Topics    Alcohol use: No       Family History   Problem Relation Age of Onset    Hypertension Mother     Cancer Mother         Lymphatic cancer    No Known Problems Father    [facility] Heart Disease Maternal Grandmother     Heart Disease Maternal Grandfather     Cancer Maternal Grandfather         Lung Cancer       Review of Systems   Constitutional: Negative for chills, fever, malaise/fatigue and weight loss. HENT: Negative for congestion, ear discharge, ear pain, hearing loss, nosebleeds, sinus pain and sore throat. Eyes: Negative for blurred vision, double vision and discharge. Respiratory: Negative for cough, hemoptysis, sputum production, shortness of breath and wheezing. Cardiovascular: Negative for chest pain, palpitations, claudication and leg swelling. Gastrointestinal: Negative for abdominal pain, constipation, diarrhea, nausea and vomiting. Genitourinary: Negative for dysuria, frequency, hematuria and urgency. Musculoskeletal: Positive for joint pain. Negative for myalgias. Skin: Positive for itching and rash. Neurological: Negative for dizziness, tingling, sensory change, speech change, focal weakness, seizures, loss of consciousness, weakness and headaches. Psychiatric/Behavioral: Negative for depression, hallucinations, substance abuse and suicidal ideas. The patient is not nervous/anxious. Physical Exam   Constitutional: He is oriented to person, place, and time. He appears well-developed and well-nourished. No distress. HENT:   Head: Normocephalic and atraumatic. Mouth/Throat: No oropharyngeal exudate. Eyes: Conjunctivae are normal. Pupils are equal, round, and reactive to light. Right eye exhibits no discharge. Left eye exhibits no discharge. No scleral icterus. Neck: Normal range of motion. Neck supple. No thyromegaly present. Cardiovascular: Normal rate, regular rhythm and normal heart sounds. Pulmonary/Chest: Effort normal and breath sounds normal. No respiratory distress. He has no rales. Abdominal: Soft. He exhibits no distension and no mass. There is no tenderness. There is no rebound. Musculoskeletal: Normal range of motion.  He exhibits no edema, tenderness or deformity. Lymphadenopathy:     He has no cervical adenopathy. Neurological: He is alert and oriented to person, place, and time. No cranial nerve deficit. Coordination normal.   Skin: Skin is warm and dry. No rash noted. He is not diaphoretic. No erythema. Psychiatric: He has a normal mood and affect. Judgment and thought content normal.   Nursing note and vitals reviewed. Assessment and plan     Plan of care has been discussed with the patient, he agrees to the plan and verbalized understanding. All his questions were answered  More than 50% of the time spent in this visit was counseling the patient about  illness and treatment options         1. Irritant contact dermatitis, unspecified trigger  Patient this cream for 1 or 2 days at a time for his dermatitis    I made him aware that the long-term use of steroids can cause thinning of the skin hyper or hypopigmentation  - clotrimazole-betamethasone (LOTRISONE) topical cream; Apply thin layer to affected area twice daily for 7 days. Dispense: 15 g; Refill: 0    2. Other depression  Stable not suicidal or homicidal mitral management stable  - escitalopram oxalate (LEXAPRO) 5 mg tablet; Take 1 Tab by mouth daily. Dispense: 90 Tab; Refill: 1    3. Anxiety  stable  - escitalopram oxalate (LEXAPRO) 5 mg tablet; Take 1 Tab by mouth daily. Dispense: 90 Tab; Refill: 1    4. Acute upper back pain    - naproxen (NAPROSYN) 500 mg tablet; Take 1 Tab by mouth two (2) times daily (with meals). Dispense: 60 Tab; Refill: 0    5. Gastroesophageal reflux disease without esophagitis   should consider having upper endoscopy at some point  - omeprazole (PRILOSEC) 20 mg capsule; Take 1 Cap by mouth daily. Dispense: 90 Cap; Refill: 1    6. Mixed hyperlipidemia   Is not on a statin, we should consider statin if LDL still elevated due family history of coronary disease    7. Family history of premature CAD      8.  Osteoarthritis of both knees, unspecified osteoarthritis type    - REFERRAL TO ORTHOPEDICS    9. Screening for colon cancer    - REFERRAL TO COLON AND RECTAL SURGERY    10. Annual physical exam    - CBC WITH AUTOMATED DIFF; Future  - LIPID PANEL; Future  - METABOLIC PANEL, COMPREHENSIVE; Future  - URINALYSIS W/MICROSCOPIC; Future    11. Screening for prostate cancer    - PSA SCREENING    12. Abnormal blood sugar    - HEMOGLOBIN A1C W/O EAG; Future    13. Class 1 obesity due to excess calories with serious comorbidity and body mass index (BMI) of 33.0 to 33.9 in adult    Lifestyle modification has been discussed with patient in length regarding decrease carbohydrate and sugar intake and increase physical activity    - REFERRAL TO DIETITIAN    Current Outpatient Medications   Medication Sig Dispense Refill    clotrimazole-betamethasone (LOTRISONE) topical cream Apply thin layer to affected area twice daily for 7 days. 15 g 0    escitalopram oxalate (LEXAPRO) 5 mg tablet Take 1 Tab by mouth daily. 90 Tab 1    naproxen (NAPROSYN) 500 mg tablet Take 1 Tab by mouth two (2) times daily (with meals). 60 Tab 0    omeprazole (PRILOSEC) 20 mg capsule Take 1 Cap by mouth daily. 90 Cap 1    multivitamin (ONE A DAY) tablet Take 1 Tab by mouth daily.  CHOLECALCIFEROL, VITAMIN D3, (VITAMIN D3 PO) Take  by mouth.  aspirin delayed-release 81 mg tablet Take  by mouth daily.  B.infantis-B.ani-B.long-B.bifi (PROBIOTIC 4X) 10-15 mg TbEC Take  by mouth.  Lancets misc Pt is checking his blood sugars 1-2 times a day. 50 Each 0    glucose blood VI test strips (ONETOUCH VERIO) strip Pt is checking his blood sugars 1-2 times a day.  48 Strip 0       Patient Active Problem List    Diagnosis Date Noted    Anxiety 02/13/2019    Gastroesophageal reflux disease without esophagitis 06/02/2018    Depression 05/25/2017    Precordial pain 10/28/2016    Family history of premature CAD 10/28/2016    Mixed hyperlipidemia 10/26/2016     Results for orders placed or performed during the hospital encounter of 24/53/19   METABOLIC PANEL, COMPREHENSIVE   Result Value Ref Range    Sodium 141 136 - 145 mmol/L    Potassium 4.3 3.5 - 5.5 mmol/L    Chloride 106 100 - 108 mmol/L    CO2 26 21 - 32 mmol/L    Anion gap 9 3.0 - 18 mmol/L    Glucose 103 (H) 74 - 99 mg/dL    BUN 19 (H) 7.0 - 18 MG/DL    Creatinine 0.95 0.6 - 1.3 MG/DL    BUN/Creatinine ratio 20 12 - 20      GFR est AA >60 >60 ml/min/1.73m2    GFR est non-AA >60 >60 ml/min/1.73m2    Calcium 8.3 (L) 8.5 - 10.1 MG/DL    Bilirubin, total 0.3 0.2 - 1.0 MG/DL    ALT (SGPT) 99 (H) 16 - 61 U/L    AST (SGOT) 47 (H) 15 - 37 U/L    Alk. phosphatase 77 45 - 117 U/L    Protein, total 7.6 6.4 - 8.2 g/dL    Albumin 4.1 3.4 - 5.0 g/dL    Globulin 3.5 2.0 - 4.0 g/dL    A-G Ratio 1.2 0.8 - 1.7     HEMOGLOBIN A1C WITH EAG   Result Value Ref Range    Hemoglobin A1c 5.5 4.2 - 5.6 %    Est. average glucose 111 mg/dL     Hospital Outpatient Visit on 02/13/2019   Component Date Value Ref Range Status    WBC 02/13/2019 6.3  4.6 - 13.2 K/uL Final    RBC 02/13/2019 4.97  4.70 - 5.50 M/uL Final    HGB 02/13/2019 15.5  13.0 - 16.0 g/dL Final    HCT 02/13/2019 48.3* 36.0 - 48.0 % Final    MCV 02/13/2019 97.2* 74.0 - 97.0 FL Final    MCH 02/13/2019 31.2  24.0 - 34.0 PG Final    MCHC 02/13/2019 32.1  31.0 - 37.0 g/dL Final    RDW 02/13/2019 13.6  11.6 - 14.5 % Final    PLATELET 39/51/2276 277  135 - 420 K/uL Final    MPV 02/13/2019 11.0  9.2 - 11.8 FL Final    NEUTROPHILS 02/13/2019 53  40 - 73 % Final    LYMPHOCYTES 02/13/2019 37  21 - 52 % Final    MONOCYTES 02/13/2019 8  3 - 10 % Final    EOSINOPHILS 02/13/2019 1  0 - 5 % Final    BASOPHILS 02/13/2019 1  0 - 2 % Final    ABS. NEUTROPHILS 02/13/2019 3.4  1.8 - 8.0 K/UL Final    ABS. LYMPHOCYTES 02/13/2019 2.3  0.9 - 3.6 K/UL Final    ABS. MONOCYTES 02/13/2019 0.5  0.05 - 1.2 K/UL Final    ABS. EOSINOPHILS 02/13/2019 0.1  0.0 - 0.4 K/UL Final    ABS.  BASOPHILS 02/13/2019 0.1  0.0 - 0.1 K/UL Final    DF 02/13/2019 AUTOMATED    Final    LIPID PROFILE 02/13/2019        Final    Cholesterol, total 02/13/2019 225* <200 MG/DL Final    Triglyceride 02/13/2019 184* <150 MG/DL Final    Comment: The drugs N-acetylcysteine (NAC) and  Metamiszole have been found to cause falsely  low results in this chemical assay. Please  be sure to submit blood samples obtained  BEFORE administration of either of these  drugs to assure correct results.  HDL Cholesterol 02/13/2019 45  40 - 60 MG/DL Final    LDL, calculated 02/13/2019 143.2* 0 - 100 MG/DL Final    VLDL, calculated 02/13/2019 36.8  MG/DL Final    CHOL/HDL Ratio 02/13/2019 5.0  0 - 5.0   Final    Sodium 02/13/2019 139  136 - 145 mmol/L Final    Potassium 02/13/2019 4.4  3.5 - 5.5 mmol/L Final    Chloride 02/13/2019 105  100 - 108 mmol/L Final    CO2 02/13/2019 27  21 - 32 mmol/L Final    Anion gap 02/13/2019 7  3.0 - 18 mmol/L Final    Glucose 02/13/2019 95  74 - 99 mg/dL Final    BUN 02/13/2019 17  7.0 - 18 MG/DL Final    Creatinine 02/13/2019 1.01  0.6 - 1.3 MG/DL Final    BUN/Creatinine ratio 02/13/2019 17  12 - 20   Final    GFR est AA 02/13/2019 >60  >60 ml/min/1.73m2 Final    GFR est non-AA 02/13/2019 >60  >60 ml/min/1.73m2 Final    Comment: (NOTE)  Estimated GFR is calculated using the Modification of Diet in Renal   Disease (MDRD) Study equation, reported for both  Americans   (GFRAA) and non- Americans (GFRNA), and normalized to 1.73m2   body surface area. The physician must decide which value applies to   the patient. The MDRD study equation should only be used in   individuals age 25 or older. It has not been validated for the   following: pregnant women, patients with serious comorbid conditions,   or on certain medications, or persons with extremes of body size,   muscle mass, or nutritional status.       Calcium 02/13/2019 8.7  8.5 - 10.1 MG/DL Final    Bilirubin, total 02/13/2019 0.6 0.2 - 1.0 MG/DL Final    ALT (SGPT) 02/13/2019 185* 16 - 61 U/L Final    AST (SGOT) 02/13/2019 83* 15 - 37 U/L Final    Alk. phosphatase 02/13/2019 93  45 - 117 U/L Final    Protein, total 02/13/2019 7.9  6.4 - 8.2 g/dL Final    Albumin 02/13/2019 4.3  3.4 - 5.0 g/dL Final    Globulin 02/13/2019 3.6  2.0 - 4.0 g/dL Final    A-G Ratio 02/13/2019 1.2  0.8 - 1.7   Final    Color 02/13/2019 YELLOW    Final    Appearance 02/13/2019 CLEAR    Final    Specific gravity 02/13/2019 1.017  1.005 - 1.030   Final    pH (UA) 02/13/2019 6.0  5.0 - 8.0   Final    Protein 02/13/2019 NEGATIVE   NEG mg/dL Final    Glucose 02/13/2019 NEGATIVE   NEG mg/dL Final    Ketone 02/13/2019 NEGATIVE   NEG mg/dL Final    Bilirubin 02/13/2019 NEGATIVE   NEG   Final    Blood 02/13/2019 NEGATIVE   NEG   Final    Urobilinogen 02/13/2019 0.2  0.2 - 1.0 EU/dL Final    Nitrites 02/13/2019 NEGATIVE   NEG   Final    Leukocyte Esterase 02/13/2019 NEGATIVE   NEG   Final    WBC 02/13/2019 PENDING  /hpf Incomplete    RBC 02/13/2019 PENDING  /hpf Incomplete    Epithelial cells 02/13/2019 PENDING  /lpf Incomplete    Bacteria 02/13/2019 PENDING  /hpf Incomplete          Follow-up Disposition:  Return in about 3 months (around 5/13/2019) for as per results .

## 2019-02-13 NOTE — PROGRESS NOTES
Tanja Smart is a 48 y.o. male presents in office physical, medication refill    Health Maintenance Due   Topic Date Due    DTaP/Tdap/Td series (1 - Tdap) 08/17/1988    Shingrix Vaccine Age 50> (1 of 2) 08/17/2017    FOBT Q 1 YEAR AGE 50-75  08/17/2017    Influenza Age 9 to Adult  08/01/2018       1. Have you been to the ER, urgent care clinic since your last visit? Hospitalized since your last visit? No    2. Have you seen or consulted any other health care providers outside of the [address] [place] [address] since your last visit? Include any pap smears or colon screening.  No

## 2019-02-19 ENCOUNTER — TELEPHONE (OUTPATIENT)
Dept: FAMILY MEDICINE CLINIC | Age: 54
End: 2019-02-19

## 2019-02-19 NOTE — PROGRESS NOTES
Elevation and lipid profile, also elevated liver enzymes and increase in hemoglobin A1c schedule follow-up visit to discuss a plan of treatment

## 2019-02-19 NOTE — TELEPHONE ENCOUNTER
Spoke to pt and made aware of the message, verbalized understanding. Offered to be scheduled sooner but he said that he will just keep the May appt. Advised to start on low carb, low sugar and low cholesterol diet and exercise.   Thank you  Pelon Barone LPN

## 2019-02-19 NOTE — TELEPHONE ENCOUNTER
----- Message from Jerman Osullivan MD sent at 2/19/2019  7:56 AM EST -----  Elevation and lipid profile, also elevated liver enzymes and increase in hemoglobin A1c schedule follow-up visit to discuss a plan of treatment

## 2019-03-13 ENCOUNTER — HOSPITAL ENCOUNTER (OUTPATIENT)
Dept: NUTRITION | Age: 54
Discharge: HOME | End: 2019-03-13
Payer: COMMERCIAL

## 2019-03-13 PROCEDURE — 97802 MEDICAL NUTRITION INDIV IN: CPT

## 2019-03-13 NOTE — PROGRESS NOTES
[address] [place] [address], [address]  Phone: [phone] Fax: [phone]   Nutrition Assessment  Medical Nutrition Therapy   Outpatient Initial Evaluation         Patient Name: Norman Marcus : 1965   Treatment Diagnosis: Obesity (class 1) with excessive comorbidities   Referral Source: Fela Guaman MD [facility]): 3/13/2019     Gender: male Age: 48 y.o. Ht: 66 in Wt: 203.2  lb  kg   BMI: 32.8 BMR   Male 1810 BMR Female      Past Medical History:  Pre-diabetes, hx of ETOH use, Hx of tobacco use     Pertinent Medications:   Prilosec, [place], Vitamin D, MVI, probiotics, naprason     Biochemical Data:   Lab Results   Component Value Date/Time    Hemoglobin A1c 6.1 (H) 2019 09:17 AM     Lab Results   Component Value Date/Time    Sodium 139 2019 09:17 AM    Potassium 4.4 2019 09:17 AM    Chloride 105 2019 09:17 AM    CO2 27 2019 09:17 AM    Anion gap 7 2019 09:17 AM    Glucose 95 2019 09:17 AM    Glucose 99 2017 08:45 AM    BUN 17 2019 09:17 AM    Creatinine 1.01 2019 09:17 AM    BUN/Creatinine ratio 17 2019 09:17 AM    GFR est AA >60 2019 09:17 AM    GFR est non-AA >60 2019 09:17 AM    Calcium 8.7 2019 09:17 AM    Bilirubin, total 0.6 2019 09:17 AM    AST (SGOT) 83 (H) 2019 09:17 AM    Alk.  phosphatase 93 2019 09:17 AM    Protein, total 7.9 2019 09:17 AM    Albumin 4.3 2019 09:17 AM    Globulin 3.6 2019 09:17 AM    A-G Ratio 1.2 2019 09:17 AM    ALT (SGPT) 185 (H) 2019 09:17 AM     Lab Results   Component Value Date/Time    Cholesterol, total 225 (H) 2019 09:17 AM    HDL Cholesterol 45 2019 09:17 AM    LDL, calculated 143.2 (H) 2019 09:17 AM    VLDL, calculated 36.8 2019 09:17 AM    Triglyceride 184 (H) 2019 09:17 AM    CHOL/HDL Ratio 5.0 2019 09:17 AM     Lab Results Component Value Date/Time    ALT (SGPT) 185 (H) 02/13/2019 09:17 AM    AST (SGOT) 83 (H) 02/13/2019 09:17 AM    Alk. phosphatase 93 02/13/2019 09:17 AM    Bilirubin, total 0.6 02/13/2019 09:17 AM     Lab Results   Component Value Date/Time    Creatinine 1.01 02/13/2019 09:17 AM     Lab Results   Component Value Date/Time    BUN 17 02/13/2019 09:17 AM     No results found for: MCACR, MCA1, MCA2, MCA3, MCAU, MCAU2, MCALPOCT     Assessment:   Pt in with wife. Pt goals are to lose weight and control his pre-diabetes. Has not attempted weight loss in the past. Pt's BMI is 32.8 (Obesity, Class I). For his height, healthy weight range = 118-154#. Pt's IBW = 142 pounds. Adjusted BW (@ 25%) = 71.5kg = 157.3 pounds. Pt reports gradual wt increase in wt since 40's. No extra PA outside of work. 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 Grandfather had T2DM, but no other family hx. Has eaten ice cream in the past but hasn't lately. Pt reports low stress at job,  in [place]. Some shift work (2x/week starts work at noon and gets home at 10:30pm). Food & Nutrition: Pt will usually skip breakfast and lunch and \"binge\" eat or have an \"eating fest\" at home. 24 food recall:   Breakfast: cheesestick inside a flour tortilla  2 brownies  Lunch: single serve ranch w/ broccoli  Dinner: 20 pizza rolls, brownie, pear, plum, BBQ chips, Mac + Cheese w/ smoked sausage.       Estimate Needs   Calories: 1700  Protein: 128 Carbs: 170 Fat: 57   Kcal/day  g/day  g/day  g/day        percent: 30  40  30               Nutrition Diagnosis Excessive calorie intake related to suboptimal eating patterns and nutrition knowledge deficit as evidenced by BMI 32.8, pt's food recall, reports \"binging eating\" at night, pt reports excessive carbohydrate intake with minimal protein, pt reports switching to tortilla for healthier option and adding peas to mac and cheese to help with pre-diabetes. Nutrition Intervention &  Education: RMR 1719 x 1.2 = 2063 kcal - 350 = 1713 kcal/day  71.5 kg Adj BW x 25 = 1790 kcal/day  64.5kg IBW x 25 = 1612 kcal/day    Recommend: 25-35g PRO/day with 0-2 snacks @ 15-30g   40-60g CHO/meal with 0-2 snacks @ 15-30g CHO    Recommend pt start with a goal of about 45-50g CHO/meal.  Suggest that pt start eating 3 meals a day and focus on eating balanced macronutrient distribution (including protein at all meals/snacks rather than overloading on carbohydrate). Educated pt on the pathophysiology of pre-diabetes and BG/insulin response to carbohydrate. Used food models to demonstrate portion size and carb counting. Reviewed handouts (carbohydrate sources and serving sizes, along with grams of CHO, reviewied protein sources and amounts). Discussed Diabetes My plate, and non-starchy veggies. Recommend pt fill half his plate with non-starchy veggies and use them in place of carbs when possible to maintain satiety/volume - cauliflower rice, shredded carrots with noodles, etc.). Reviewed and suggested healthy snack and meal alternatives. Practiced reading food label on example food product and compared M&Ms and Soda to complex carbohydrate options.     Handouts Provided: [x]  Carbohydrates  [x]  Protein  [x]  Non-starchy Vegatbles  [x]  Food Label  []  Meal and Snack Ideas  []  Food Journals [x]  Diabetes  []  Cholesterol  []  Sodium  []  Gen Nutr Guidelines  []  SBGM Guidelines  []  Others:   Information Reviewed with: Pt and wife   Readiness to Change Stage: []  Pre-contemplative    [x]  Contemplative  []  Preparation               []  Action                  []  Maintenance   Potential Barriers to Learning: []  Decline in memory    []  Language barrier   []  Other:  []  Emotional                  []  Limited mobility  []  Lack of motivation     [] Vision, hearing or cognitive impairment   Expected Compliance: Good as pt seems motivated and has already lost 2 pounds since his last  Visit (per pt's report). Nutritional Goal - To promote lifestyle changes to result in:    [x]  Weight loss  [x]  Improved diabetic control  []  Decreased cholesterol levels  []  Decreased blood pressure  []  Weight maintenance []  Preventing any interactions associated with food allergies  []  Adequate weight gain toward goal weight  []  Other:        Patient Goals:   1. Log food and beverages, to keep within ranges provided. 2. Read nutrition labels of current foods and any food that is being purchased. 3. Think about triggers/cues to night-time binging and brainstorm alternatives (bring in to next appointment).       Dietitian Signature: Dipak Armendariz Date: 3/13/2019   Follow-up: April 11 @ 10am Time: 10:41 AM

## 2019-04-11 ENCOUNTER — APPOINTMENT (OUTPATIENT)
Dept: NUTRITION | Age: 54
End: 2019-04-11

## 2019-04-16 ENCOUNTER — PATIENT OUTREACH (OUTPATIENT)
Dept: FAMILY MEDICINE CLINIC | Age: 54
End: 2019-04-16

## 2019-04-16 NOTE — PROGRESS NOTES
[facility] health screening:    Ioana Eugenio confirms pt completed his colonoscopy April 12th and report should be faxed to [facility] soon.

## 2019-05-14 ENCOUNTER — TELEPHONE (OUTPATIENT)
Dept: FAMILY MEDICINE CLINIC | Age: 54
End: 2019-05-14

## 2019-05-15 ENCOUNTER — OFFICE VISIT (OUTPATIENT)
Dept: FAMILY MEDICINE CLINIC | Age: 54
End: 2019-05-15

## 2019-05-15 VITALS
WEIGHT: 197 LBS | HEART RATE: 60 BPM | RESPIRATION RATE: 17 BRPM | DIASTOLIC BLOOD PRESSURE: 98 MMHG | SYSTOLIC BLOOD PRESSURE: 149 MMHG | TEMPERATURE: 98.3 F | OXYGEN SATURATION: 99 % | HEIGHT: 65 IN | BODY MASS INDEX: 32.82 KG/M2

## 2019-05-15 DIAGNOSIS — F32.89 OTHER DEPRESSION: ICD-10-CM

## 2019-05-15 DIAGNOSIS — K21.9 GASTROESOPHAGEAL REFLUX DISEASE WITHOUT ESOPHAGITIS: Primary | ICD-10-CM

## 2019-05-15 DIAGNOSIS — Z23 NEED FOR SHINGLES VACCINE: ICD-10-CM

## 2019-05-15 DIAGNOSIS — F41.9 ANXIETY: ICD-10-CM

## 2019-05-15 DIAGNOSIS — R03.0 ELEVATED BP WITHOUT DIAGNOSIS OF HYPERTENSION: ICD-10-CM

## 2019-05-15 DIAGNOSIS — R73.03 PREDIABETES: ICD-10-CM

## 2019-05-15 DIAGNOSIS — E78.2 MIXED HYPERLIPIDEMIA: ICD-10-CM

## 2019-05-15 RX ORDER — OMEPRAZOLE 20 MG/1
20 CAPSULE, DELAYED RELEASE ORAL DAILY
Qty: 90 CAP | Refills: 1 | Status: SHIPPED | OUTPATIENT
Start: 2019-05-15 | End: 2019-10-24 | Stop reason: SDUPTHER

## 2019-05-15 RX ORDER — METFORMIN HYDROCHLORIDE 500 MG/1
500 TABLET ORAL 2 TIMES DAILY WITH MEALS
Qty: 180 TAB | Refills: 1 | Status: SHIPPED | OUTPATIENT
Start: 2019-05-15 | End: 2019-10-25 | Stop reason: SDUPTHER

## 2019-05-15 RX ORDER — ATORVASTATIN CALCIUM 20 MG/1
20 TABLET, FILM COATED ORAL DAILY
Qty: 90 TAB | Refills: 1 | Status: SHIPPED | OUTPATIENT
Start: 2019-05-15 | End: 2019-10-14 | Stop reason: SDUPTHER

## 2019-05-15 RX ORDER — ESCITALOPRAM OXALATE 5 MG/1
5 TABLET ORAL DAILY
Qty: 90 TAB | Refills: 1 | Status: SHIPPED | OUTPATIENT
Start: 2019-05-15 | End: 2019-10-14 | Stop reason: SDUPTHER

## 2019-05-15 NOTE — PATIENT INSTRUCTIONS
Low Sodium Diet (2,000 Milligram): Care Instructions  Your Care Instructions    Too much sodium causes your body to hold on to extra water. This can raise your blood pressure and force your heart and kidneys to work harder. In very serious cases, this could cause you to be put in the hospital. It might even be life-threatening. By limiting sodium, you will feel better and lower your risk of serious problems. The most common source of sodium is salt. People get most of the salt in their diet from canned, prepared, and packaged foods. Fast food and restaurant meals also are very high in sodium. Your doctor will probably limit your sodium to less than 2,000 milligrams (mg) a day. This limit counts all the sodium in prepared and packaged foods and any salt you add to your food. Follow-up care is a key part of your treatment and safety. Be sure to make and go to all appointments, and call your doctor if you are having problems. It's also a good idea to know your test results and keep a list of the medicines you take. How can you care for yourself at home? Read food labels  · Read labels on cans and food packages. The labels tell you how much sodium is in each serving. Make sure that you look at the serving size. If you eat more than the serving size, you have eaten more sodium. · Food labels also tell you the Percent Daily Value for sodium. Choose products with low Percent Daily Values for sodium. · Be aware that sodium can come in forms other than salt, including monosodium glutamate (MSG), sodium citrate, and sodium bicarbonate (baking soda). MSG is often added to Asian food. When you eat out, you can sometimes ask for food without MSG or added salt. Buy low-sodium foods  · Buy foods that are labeled \"unsalted\" (no salt added), \"sodium-free\" (less than 5 mg of sodium per serving), or \"low-sodium\" (less than 140 mg of sodium per serving).  Foods labeled \"reduced-sodium\" and \"light sodium\" may still have too much sodium. Be sure to read the label to see how much sodium you are getting. · Buy fresh vegetables, or frozen vegetables without added sauces. Buy low-sodium versions of canned vegetables, soups, and other canned goods. Prepare low-sodium meals  · Cut back on the amount of salt you use in cooking. This will help you adjust to the taste. Do not add salt after cooking. One teaspoon of salt has about 2,300 mg of sodium. · Take the salt shaker off the table. · Flavor your food with garlic, lemon juice, onion, vinegar, herbs, and spices. Do not use soy sauce, lite soy sauce, steak sauce, onion salt, garlic salt, celery salt, mustard, or ketchup on your food. · Use low-sodium salad dressings, sauces, and ketchup. Or make your own salad dressings and sauces without adding salt. · Use less salt (or none) when recipes call for it. You can often use half the salt a recipe calls for without losing flavor. Other foods such as rice, pasta, and grains do not need added salt. · Rinse canned vegetables, and cook them in fresh water. This removes some--but not all--of the salt. · Avoid water that is naturally high in sodium or that has been treated with water softeners, which add sodium. Call your local water company to find out the sodium content of your water supply. If you buy bottled water, read the label and choose a sodium-free brand. Avoid high-sodium foods  · Avoid eating:  ? Smoked, cured, salted, and canned meat, fish, and poultry. ? Ham, angeles, hot dogs, and luncheon meats. ? Regular, hard, and processed cheese and regular peanut butter. ? Crackers with salted tops, and other salted snack foods such as pretzels, chips, and salted popcorn. ? Frozen prepared meals, unless labeled low-sodium. ? Canned and dried soups, broths, and bouillon, unless labeled sodium-free or low-sodium. ? Canned vegetables, unless labeled sodium-free or low-sodium. ? [place] fries, pizza, tacos, and other fast foods.   ? Melanie Menjivar, olives, ketchup, and other condiments, especially soy sauce, unless labeled sodium-free or low-sodium. Where can you learn more? Go to http://tyesha-natalia.info/. Enter Y610 in the search box to learn more about \"Low Sodium Diet (2,000 Milligram): Care Instructions. \"  Current as of: March 28, 2018  Content Version: 11.9  © 3759-9921 [employer]. Care instructions adapted under license by [employer] (which disclaims liability or warranty for this information). If you have questions about a medical condition or this instruction, always ask your healthcare professional. [place] [address] any warranty or liability for your use of this information. How to Read a Food Label to Limit Sodium: Care Instructions  Your Care Instructions  Sodium causes your body to hold on to extra water. This can raise your blood pressure and force your heart and kidneys to work harder. In very serious cases, this could cause you to be put in the hospital. It might even be life-threatening. By limiting sodium, you will feel better and lower your risk of serious problems. Processed foods, fast food, and restaurant foods are the major sources of dietary sodium. The most common name for sodium is salt. Try to limit how much sodium you eat to less than 2,300 milligrams (mg) a day. If you limit your sodium to 1,500 mg a day, you can lower your blood pressure even more. This limit counts all the salt that you eat in foods you cook or in packaged foods. Keep a list of everything you eat and drink. Follow-up care is a key part of your treatment and safety. Be sure to make and go to all appointments, and call your doctor if you are having problems. It's also a good idea to know your test results and keep a list of the medicines you take. How can you care for yourself at home?   Read ingredient lists on food labels  · Read the list of ingredients on food labels to help you find how much sodium is in a food. The label lists the ingredients in a food in descending order (from the most to the least). If salt or sodium is high on the list, there may be a lot of sodium in the food. · Know that sodium has different names. Sodium is also called monosodium glutamate (MSG, common in [place] food), sodium citrate, sodium alginate, sodium hydroxide, and sodium phosphate. Read Nutrition Facts labels  · On most foods, there is a Nutrition Facts label. This will tell you how much sodium is in one serving of food. Look at both the serving size and the sodium amount. The serving size is located at the top of the label, usually right under the \"Nutrition Facts\" title. The amount of sodium is given in the list under the title. It is given in milligrams (mg). ? Check the serving size carefully. A single serving is often very small, and you may eat more than one serving. If this is the case, you will eat more sodium than listed on the label. For example, if the serving size for a canned soup is 1 cup and the sodium amount is 470 mg, if you have 2 cups you will eat 940 mg of sodium. · The nutrition facts for fresh fruits and vegetables are not listed on the food. They may be listed somewhere in the store. These foods usually have no sodium or low sodium. · The Nutrition Facts label also gives you the Percent Daily Value for sodium. This is how much of the recommended amount of sodium a serving contains. The daily value for sodium is less than 2,300 mg. So if the Percent Daily Value says 50%, this means one serving is giving you half of this, or 1,150 mg. Buy low-sodium foods  · Look for foods that are made with less sodium. Watch for the following words on the label. ? \"Unsalted\" means there is no sodium added to the food. But there may be sodium already in the food naturally. ? \"Sodium-free\" means a serving has less than 5 mg of sodium. ?  \"Very low sodium\" means a serving has 35 mg or less of sodium. ? \"Low-sodium\" means a serving has 140 mg or less of sodium. · \"Reduced-sodium\" means that there is 25% less sodium than what the food normally has. This is still usually too much sodium. Try not to buy foods with this on the label. · Buy fresh vegetables, or frozen vegetables without added sauces. Buy low-sodium versions of canned vegetables, soups, and other canned goods. Where can you learn more? Go to http://tyesha-natalia.info/. Enter 26 076085 in the search box to learn more about \"How to Read a Food Label to Limit Sodium: Care Instructions. \"  Current as of: March 28, 2018  Content Version: 11.9  © 8311-0096 [employer]. Care instructions adapted under license by [employer] (which disclaims liability or warranty for this information). If you have questions about a medical condition or this instruction, always ask your healthcare professional. [place] [address] any warranty or liability for your use of this information.

## 2019-05-15 NOTE — PROGRESS NOTES
Drake Barrios is a 48 y.o. male presents to office for physical and skin problem and 3 weeks    Medication list has been reviewed with Drake Barrios and updated as of today's date     Health Maintenance items with a due date reviewed with patient:  Health Maintenance Due   Topic Date Due    DTaP/Tdap/Td series (1 - Tdap) 08/17/1988    Shingrix Vaccine Age 50> (1 of 2) 08/17/2017    FOBT Q 1 YEAR AGE 50-75  08/17/2017

## 2019-05-15 NOTE — PROGRESS NOTES
Jenise Dean     Chief Complaint   Patient presents with    Physical     Vitals:    05/15/19 0802   BP: (!) 149/98   Pulse: 60   Resp: 17   Temp: 98.3 °F (36.8 °C)   TempSrc: Oral   SpO2: 99%   Weight: 197 lb (89.4 kg)   Height: 5' 5\" (1.651 m)   PainSc:   0 - No pain         HPI: He is here for follow up and medication refill , patient is elevated today has not been diagnosed with hypertension  He has no symptoms related to hypertension no headache no dizziness no blurred vision no chest pain no shortness of breath. Patient is complaining about erythematous rash in the left arm for a few days, slightly itching. Is on and off  H/O smoking for 40 yeas and stopped 5 years ago but patient currently smoking vape with nicotine        Past Medical History:   Diagnosis Date    Exposure to chemical pollution     Hypercholesterolemia     Migraine      Past Surgical History:   Procedure Laterality Date    HX KNEE ARTHROSCOPY Left     HX OPEN REDUCTION INTERNAL FIXATION Right     pins one surgery; pins removed on a separate surgery    HX OTHER SURGICAL      Total top teeth removal and removable plate installed    HX VASECTOMY      HX WISDOM TEETH EXTRACTION       Social History     Tobacco Use    Smoking status: Never Smoker    Smokeless tobacco: Current User    Tobacco comment: Vape with 0.3 nicotine   Substance Use Topics    Alcohol use: No       Family History   Problem Relation Age of Onset    Hypertension Mother     Cancer Mother         Lymphatic cancer    No Known Problems Father     Heart Disease Maternal Grandmother     Heart Disease Maternal Grandfather     Cancer Maternal Grandfather         Lung Cancer       Review of Systems   Constitutional: Negative for chills, fever, malaise/fatigue and weight loss. HENT: Negative for congestion, ear discharge, ear pain, hearing loss and nosebleeds. Eyes: Negative for blurred vision, double vision and discharge. Respiratory: Negative for cough. Cardiovascular: Negative for chest pain, palpitations, claudication and leg swelling. Gastrointestinal: Negative for abdominal pain, constipation, diarrhea, nausea and vomiting. Genitourinary: Negative for dysuria, frequency and urgency. Musculoskeletal: Negative for myalgias. Skin: Negative for itching and rash. Neurological: Negative for dizziness, tingling, sensory change, speech change, focal weakness, weakness and headaches. Psychiatric/Behavioral: Negative for depression and suicidal ideas. Physical Exam   Constitutional: He is oriented to person, place, and time. He appears well-developed and well-nourished. No distress. HENT:   Head: Normocephalic and atraumatic. Mouth/Throat: No oropharyngeal exudate. Eyes: Pupils are equal, round, and reactive to light. Conjunctivae are normal. Right eye exhibits no discharge. Left eye exhibits no discharge. No scleral icterus. Neck: Normal range of motion. Neck supple. No thyromegaly present. Cardiovascular: Normal rate, regular rhythm and normal heart sounds. Pulmonary/Chest: Effort normal and breath sounds normal. No respiratory distress. He has no rales. Abdominal: Soft. Bowel sounds are normal. He exhibits no distension and no mass. There is no tenderness. There is no rebound. Musculoskeletal: Normal range of motion. He exhibits no edema, tenderness or deformity. Lymphadenopathy:     He has no cervical adenopathy. Neurological: He is alert and oriented to person, place, and time. No cranial nerve deficit. Coordination normal.   Skin: Skin is warm and dry. No rash noted. He is not diaphoretic. No erythema. On examination the area that the patient stated that it was red it was covered was a tattoo could not appreciate any redness or erythema or any skin changes!!   Psychiatric: He has a normal mood and affect. Judgment and thought content normal.   Nursing note and vitals reviewed.        Assessment and plan     Plan of care has been discussed with the patient, he agrees to the plan and verbalized understanding. All his questions were answered  More than 50% of the time spent in this visit was counseling the patient about  illness and treatment options         1. Gastroesophageal reflux disease without esophagitis    - omeprazole (PRILOSEC) 20 mg capsule; Take 1 Cap by mouth daily. Dispense: 90 Cap; Refill: 1    2. Prediabetes    - metFORMIN (GLUCOPHAGE) 500 mg tablet; Take 1 Tab by mouth two (2) times daily (with meals). Dispense: 180 Tab; Refill: 1    3. Other depression  Stable mood  - escitalopram oxalate (LEXAPRO) 5 mg tablet; Take 1 Tab by mouth daily. Indications: Anxiousness associated with Depression  Dispense: 90 Tab; Refill: 1    4. Anxiety  Well controlled with medication  - escitalopram oxalate (LEXAPRO) 5 mg tablet; Take 1 Tab by mouth daily. Indications: Anxiousness associated with Depression  Dispense: 90 Tab; Refill: 1    5. Mixed hyperlipidemia    Elevated lipid panel and elevated LDL patient will be restarted back on the atorvastatin 20 mg daily. Previously there was an allergy recorded to Lipitor but patient is not allergic to it it caused the symptoms of the skin allergy continued after taking Lipitor and most likely due to dye in the juice that he was drinking daily at that time     - atorvastatin (LIPITOR) 20 mg tablet; Take 1 Tab by mouth daily. Dispense: 90 Tab; Refill: 1    6. Elevated BP without diagnosis of hypertension    To reduce salt intake and record blood pressure and follow-up in 2 months  - [facility] [place] [facility]    7. Need for shingles vaccine    - varicella-zoster recombinant, PF, (SHINGRIX, PF,) 50 mcg/0.5 mL susr injection; 0.5 mL by IntraMUSCular route once for 1 dose. Dispense: 0.5 mL; Refill: 0    Current Outpatient Medications   Medication Sig Dispense Refill    metFORMIN (GLUCOPHAGE) 500 mg tablet Take 1 Tab by mouth two (2) times daily (with meals).  180 Tab 1    atorvastatin (LIPITOR) 20 mg tablet Take 1 Tab by mouth daily. 90 Tab 1    escitalopram oxalate (LEXAPRO) 5 mg tablet Take 1 Tab by mouth daily. Indications: Anxiousness associated with Depression 90 Tab 1    omeprazole (PRILOSEC) 20 mg capsule Take 1 Cap by mouth daily. 90 Cap 1    clotrimazole-betamethasone (LOTRISONE) topical cream Apply thin layer to affected area twice daily for 7 days. 15 g 0    naproxen (NAPROSYN) 500 mg tablet Take 1 Tab by mouth two (2) times daily (with meals). 60 Tab 0    multivitamin (ONE A DAY) tablet Take 1 Tab by mouth daily.  CHOLECALCIFEROL, VITAMIN D3, (VITAMIN D3 PO) Take  by mouth.  aspirin delayed-release 81 mg tablet Take  by mouth daily.  B.infantis-B.ani-B.long-B.bifi (PROBIOTIC 4X) 10-15 mg TbEC Take  by mouth.  Lancets misc Pt is checking his blood sugars 1-2 times a day. 50 Each 0    glucose blood VI test strips (ONETOUCH VERIO) strip Pt is checking his blood sugars 1-2 times a day. 50 Strip 0    cholecalciferol, vitamin D3, 50,000 unit tab Take 1 Tab by mouth every seven (7) days for 8 doses. 8 Tab 0       Patient Active Problem List    Diagnosis Date Noted    Anxiety 02/13/2019    Gastroesophageal reflux disease without esophagitis 06/02/2018    Depression 05/25/2017    Precordial pain 10/28/2016    Family history of premature CAD 10/28/2016    Mixed hyperlipidemia 10/26/2016     Results for orders placed or performed during the hospital encounter of 02/13/19   CBC WITH AUTOMATED DIFF   Result Value Ref Range    WBC 6.3 4.6 - 13.2 K/uL    RBC 4.97 4.70 - 5.50 M/uL    HGB 15.5 13.0 - 16.0 g/dL    HCT 48.3 (H) 36.0 - 48.0 %    MCV 97.2 (H) 74.0 - 97.0 FL    MCH 31.2 24.0 - 34.0 PG    MCHC 32.1 31.0 - 37.0 g/dL    RDW 13.6 11.6 - 14.5 %    PLATELET 583 054 - 738 K/uL    MPV 11.0 9.2 - 11.8 FL    NEUTROPHILS 53 40 - 73 %    LYMPHOCYTES 37 21 - 52 %    MONOCYTES 8 3 - 10 %    EOSINOPHILS 1 0 - 5 %    BASOPHILS 1 0 - 2 %    ABS.  NEUTROPHILS 3.4 1.8 - 8.0 K/UL    ABS. LYMPHOCYTES 2.3 0.9 - 3.6 K/UL    ABS. MONOCYTES 0.5 0.05 - 1.2 K/UL    ABS. EOSINOPHILS 0.1 0.0 - 0.4 K/UL    ABS. BASOPHILS 0.1 0.0 - 0.1 K/UL    DF AUTOMATED     LIPID PANEL   Result Value Ref Range    LIPID PROFILE          Cholesterol, total 225 (H) <200 MG/DL    Triglyceride 184 (H) <150 MG/DL    HDL Cholesterol 45 40 - 60 MG/DL    LDL, calculated 143.2 (H) 0 - 100 MG/DL    VLDL, calculated 36.8 MG/DL    CHOL/HDL Ratio 5.0 0 - 5.0     METABOLIC PANEL, COMPREHENSIVE   Result Value Ref Range    Sodium 139 136 - 145 mmol/L    Potassium 4.4 3.5 - 5.5 mmol/L    Chloride 105 100 - 108 mmol/L    CO2 27 21 - 32 mmol/L    Anion gap 7 3.0 - 18 mmol/L    Glucose 95 74 - 99 mg/dL    BUN 17 7.0 - 18 MG/DL    Creatinine 1.01 0.6 - 1.3 MG/DL    BUN/Creatinine ratio 17 12 - 20      GFR est AA >60 >60 ml/min/1.73m2    GFR est non-AA >60 >60 ml/min/1.73m2    Calcium 8.7 8.5 - 10.1 MG/DL    Bilirubin, total 0.6 0.2 - 1.0 MG/DL    ALT (SGPT) 185 (H) 16 - 61 U/L    AST (SGOT) 83 (H) 15 - 37 U/L    Alk. phosphatase 93 45 - 117 U/L    Protein, total 7.9 6.4 - 8.2 g/dL    Albumin 4.3 3.4 - 5.0 g/dL    Globulin 3.6 2.0 - 4.0 g/dL    A-G Ratio 1.2 0.8 - 1.7     HEMOGLOBIN A1C W/O EAG   Result Value Ref Range    Hemoglobin A1c 6.1 (H) 4.2 - 5.6 %   URINALYSIS W/MICROSCOPIC   Result Value Ref Range    Color YELLOW      Appearance CLEAR      Specific gravity 1.017 1.005 - 1.030      pH (UA) 6.0 5.0 - 8.0      Protein NEGATIVE  NEG mg/dL    Glucose NEGATIVE  NEG mg/dL    Ketone NEGATIVE  NEG mg/dL    Bilirubin NEGATIVE  NEG      Blood NEGATIVE  NEG      Urobilinogen 0.2 0.2 - 1.0 EU/dL    Nitrites NEGATIVE  NEG      Leukocyte Esterase NEGATIVE  NEG      WBC 0 0 - 4 /hpf    RBC 0 0 - 5 /hpf    Epithelial cells FEW 0 - 5 /lpf    Bacteria NEGATIVE  NEG /hpf     No visits with results within 3 Month(s) from this visit.    Latest known visit with results is:   Hospital Outpatient Visit on 02/13/2019   Component Date Value Ref Range Status    WBC 02/13/2019 6.3  4.6 - 13.2 K/uL Final    RBC 02/13/2019 4.97  4.70 - 5.50 M/uL Final    HGB 02/13/2019 15.5  13.0 - 16.0 g/dL Final    HCT 02/13/2019 48.3* 36.0 - 48.0 % Final    MCV 02/13/2019 97.2* 74.0 - 97.0 FL Final    MCH 02/13/2019 31.2  24.0 - 34.0 PG Final    MCHC 02/13/2019 32.1  31.0 - 37.0 g/dL Final    RDW 02/13/2019 13.6  11.6 - 14.5 % Final    PLATELET 93/49/6826 515  135 - 420 K/uL Final    MPV 02/13/2019 11.0  9.2 - 11.8 FL Final    NEUTROPHILS 02/13/2019 53  40 - 73 % Final    LYMPHOCYTES 02/13/2019 37  21 - 52 % Final    MONOCYTES 02/13/2019 8  3 - 10 % Final    EOSINOPHILS 02/13/2019 1  0 - 5 % Final    BASOPHILS 02/13/2019 1  0 - 2 % Final    ABS. NEUTROPHILS 02/13/2019 3.4  1.8 - 8.0 K/UL Final    ABS. LYMPHOCYTES 02/13/2019 2.3  0.9 - 3.6 K/UL Final    ABS. MONOCYTES 02/13/2019 0.5  0.05 - 1.2 K/UL Final    ABS. EOSINOPHILS 02/13/2019 0.1  0.0 - 0.4 K/UL Final    ABS. BASOPHILS 02/13/2019 0.1  0.0 - 0.1 K/UL Final    DF 02/13/2019 AUTOMATED    Final    LIPID PROFILE 02/13/2019        Final    Cholesterol, total 02/13/2019 225* <200 MG/DL Final    Triglyceride 02/13/2019 184* <150 MG/DL Final    Comment: The drugs N-acetylcysteine (NAC) and  Metamiszole have been found to cause falsely  low results in this chemical assay. Please  be sure to submit blood samples obtained  BEFORE administration of either of these  drugs to assure correct results.       HDL Cholesterol 02/13/2019 45  40 - 60 MG/DL Final    LDL, calculated 02/13/2019 143.2* 0 - 100 MG/DL Final    VLDL, calculated 02/13/2019 36.8  MG/DL Final    CHOL/HDL Ratio 02/13/2019 5.0  0 - 5.0   Final    Sodium 02/13/2019 139  136 - 145 mmol/L Final    Potassium 02/13/2019 4.4  3.5 - 5.5 mmol/L Final    Chloride 02/13/2019 105  100 - 108 mmol/L Final    CO2 02/13/2019 27  21 - 32 mmol/L Final    Anion gap 02/13/2019 7  3.0 - 18 mmol/L Final    Glucose 02/13/2019 95  74 - 99 mg/dL Final    BUN 02/13/2019 17  7.0 - 18 MG/DL Final    Creatinine 02/13/2019 1.01  0.6 - 1.3 MG/DL Final    BUN/Creatinine ratio 02/13/2019 17  12 - 20   Final    GFR est AA 02/13/2019 >60  >60 ml/min/1.73m2 Final    GFR est non-AA 02/13/2019 >60  >60 ml/min/1.73m2 Final    Comment: (NOTE)  Estimated GFR is calculated using the Modification of Diet in Renal   Disease (MDRD) Study equation, reported for both  Americans   (GFRAA) and non- Americans (GFRNA), and normalized to 1.73m2   body surface area. The physician must decide which value applies to   the patient. The MDRD study equation should only be used in   individuals age 25 or older. It has not been validated for the   following: pregnant women, patients with serious comorbid conditions,   or on certain medications, or persons with extremes of body size,   muscle mass, or nutritional status.  Calcium 02/13/2019 8.7  8.5 - 10.1 MG/DL Final    Bilirubin, total 02/13/2019 0.6  0.2 - 1.0 MG/DL Final    ALT (SGPT) 02/13/2019 185* 16 - 61 U/L Final    AST (SGOT) 02/13/2019 83* 15 - 37 U/L Final    Alk.  phosphatase 02/13/2019 93  45 - 117 U/L Final    Protein, total 02/13/2019 7.9  6.4 - 8.2 g/dL Final    Albumin 02/13/2019 4.3  3.4 - 5.0 g/dL Final    Globulin 02/13/2019 3.6  2.0 - 4.0 g/dL Final    A-G Ratio 02/13/2019 1.2  0.8 - 1.7   Final    Hemoglobin A1c 02/13/2019 6.1* 4.2 - 5.6 % Final    Comment: (NOTE)  HbA1C Interpretive Ranges  <5.7              Normal  5.7 - 6.4         Consider Prediabetes  >6.5              Consider Diabetes      Color 02/13/2019 YELLOW    Final    Appearance 02/13/2019 CLEAR    Final    Specific gravity 02/13/2019 1.017  1.005 - 1.030   Final    pH (UA) 02/13/2019 6.0  5.0 - 8.0   Final    Protein 02/13/2019 NEGATIVE   NEG mg/dL Final    Glucose 02/13/2019 NEGATIVE   NEG mg/dL Final    Ketone 02/13/2019 NEGATIVE   NEG mg/dL Final    Bilirubin 02/13/2019 NEGATIVE   NEG   Final    Blood 02/13/2019 NEGATIVE NEG   Final    Urobilinogen 02/13/2019 0.2  0.2 - 1.0 EU/dL Final    Nitrites 02/13/2019 NEGATIVE   NEG   Final    Leukocyte Esterase 02/13/2019 NEGATIVE   NEG   Final    WBC 02/13/2019 0  0 - 4 /hpf Final    RBC 02/13/2019 0  0 - 5 /hpf Final    Epithelial cells 02/13/2019 FEW  0 - 5 /lpf Final    Bacteria 02/13/2019 NEGATIVE   NEG /hpf Final

## 2019-06-14 ENCOUNTER — PATIENT OUTREACH (OUTPATIENT)
Dept: FAMILY MEDICINE CLINIC | Age: 54
End: 2019-06-14

## 2019-06-14 NOTE — PROGRESS NOTES
health screening:    [facility] requesting April 12th colonoscopy report with surveillance recommendation please be faxed to Dr. Kimber Glass.

## 2019-08-02 ENCOUNTER — PATIENT OUTREACH (OUTPATIENT)
Dept: FAMILY MEDICINE CLINIC | Age: 54
End: 2019-08-02

## 2019-08-02 NOTE — PROGRESS NOTES
[facility] health screening:    Clarification of surveillance colonoscopy due date is 3 yrs/Dr. Niru Sawant office. They will fax a letter stating as such. Closed this episode of care.

## 2019-10-01 ENCOUNTER — OFFICE VISIT (OUTPATIENT)
Dept: FAMILY MEDICINE CLINIC | Age: 54
End: 2019-10-01

## 2019-10-01 VITALS
DIASTOLIC BLOOD PRESSURE: 87 MMHG | TEMPERATURE: 97.8 F | SYSTOLIC BLOOD PRESSURE: 132 MMHG | WEIGHT: 205.8 LBS | OXYGEN SATURATION: 97 % | BODY MASS INDEX: 34.29 KG/M2 | RESPIRATION RATE: 18 BRPM | HEART RATE: 72 BPM | HEIGHT: 65 IN

## 2019-10-01 DIAGNOSIS — E16.2 HYPOGLYCEMIA: ICD-10-CM

## 2019-10-01 DIAGNOSIS — J20.9 ACUTE BRONCHITIS, UNSPECIFIED ORGANISM: ICD-10-CM

## 2019-10-01 DIAGNOSIS — R79.89 ABNORMAL LIVER FUNCTION TEST: ICD-10-CM

## 2019-10-01 DIAGNOSIS — R73.03 PREDIABETES: ICD-10-CM

## 2019-10-01 DIAGNOSIS — E78.2 MIXED HYPERLIPIDEMIA: Primary | ICD-10-CM

## 2019-10-01 DIAGNOSIS — R03.0 ELEVATED BP WITHOUT DIAGNOSIS OF HYPERTENSION: ICD-10-CM

## 2019-10-01 DIAGNOSIS — J40 BRONCHITIS: ICD-10-CM

## 2019-10-01 DIAGNOSIS — R42 DIZZINESS: ICD-10-CM

## 2019-10-01 RX ORDER — GUAIFENESIN 600 MG/1
600 TABLET, EXTENDED RELEASE ORAL 2 TIMES DAILY
Qty: 20 TAB | Refills: 0 | Status: SHIPPED | OUTPATIENT
Start: 2019-10-01 | End: 2019-10-25

## 2019-10-01 RX ORDER — AMOXICILLIN AND CLAVULANATE POTASSIUM 875; 125 MG/1; MG/1
1 TABLET, FILM COATED ORAL EVERY 12 HOURS
Qty: 20 TAB | Refills: 0 | Status: SHIPPED | OUTPATIENT
Start: 2019-10-01 | End: 2019-10-11

## 2019-10-01 NOTE — PROGRESS NOTES
Garfield Callahan is a 48 y.o. male presents in office for c/o cough and cold. Health Maintenance Due   Topic Date Due    DTaP/Tdap/Td series (1 - Tdap) 08/17/1988    Shingrix Vaccine Age 50> (1 of 2) 08/17/2017    Influenza Age 5 to Adult  08/01/2019       1. Have you been to the ER, urgent care clinic since your last visit? Hospitalized since your last visit? No    2. Have you seen or consulted any other health care providers outside of the [address] [place] [address] since your last visit? Include any pap smears or colon screening.  No

## 2019-10-01 NOTE — PROGRESS NOTES
Ivan Banda     Chief Complaint   Patient presents with    Cough     Vitals:    10/01/19 1313   BP: 132/87   Pulse: 72   Resp: 18   Temp: 97.8 °F (36.6 °C)   TempSrc: Oral   SpO2: 97%   Weight: 205 lb 12.8 oz (93.4 kg)   Height: 5' 5\" (1.651 m)   PainSc:   5   PainLoc: Chest         HPI:Mr. Christopher Rodriguez is complaining about Cough and congestion for 2 week ,has tried  OTC DayQuil and NyQuil with some help but this was the medication wears off his symptoms comes back, no much improvement in his symptoms afterwards getting worse, he Has facial pain and sinus congestion is been having headaches. No fever no chills no shortness of breath. He is coughing and cough is productive of whitish sputum    He is still vaping and tappering down and he is planning to stop vaping in the next few weeks, and he completely quit cigarette smoking    Patient also is due for blood work  For hyperlipidemia and prediabetes    Past Medical History:   Diagnosis Date    Exposure to chemical pollution     Hypercholesterolemia     Migraine      Past Surgical History:   Procedure Laterality Date    HX KNEE ARTHROSCOPY Left     HX OPEN REDUCTION INTERNAL FIXATION Right     pins one surgery; pins removed on a separate surgery    HX OTHER SURGICAL      Total top teeth removal and removable plate installed    HX VASECTOMY      HX WISDOM TEETH EXTRACTION       Social History     Tobacco Use    Smoking status: Never Smoker    Smokeless tobacco: Current User    Tobacco comment: Vape with 0.3 nicotine   Substance Use Topics    Alcohol use: No       Family History   Problem Relation Age of Onset    Hypertension Mother     Cancer Mother         Lymphatic cancer    No Known Problems Father     Heart Disease Maternal Grandmother     Heart Disease Maternal Grandfather     Cancer Maternal Grandfather         Lung Cancer       Review of Systems   Constitutional: Negative for chills, fever, malaise/fatigue and weight loss.    HENT: Positive for congestion and sinus pain. Negative for ear discharge, ear pain, hearing loss, nosebleeds and sore throat. Eyes: Negative for blurred vision, double vision and discharge. Respiratory: Positive for cough, sputum production and shortness of breath. Negative for hemoptysis, wheezing and stridor. Cardiovascular: Negative for chest pain, palpitations, claudication and leg swelling. Gastrointestinal: Negative for abdominal pain, constipation, diarrhea, heartburn, nausea and vomiting. Genitourinary: Negative for dysuria, frequency and urgency. Musculoskeletal: Positive for joint pain and myalgias. Skin: Negative for itching and rash. Neurological: Positive for dizziness and headaches. Negative for tingling, sensory change, speech change, focal weakness and weakness. Physical Exam   Constitutional: He is oriented to person, place, and time. He appears well-developed and well-nourished. No distress. HENT:   Head: Normocephalic and atraumatic. Mouth/Throat: No oropharyngeal exudate. Eyes: Pupils are equal, round, and reactive to light. Conjunctivae are normal. Right eye exhibits no discharge. Left eye exhibits no discharge. No scleral icterus. Neck: Normal range of motion. Neck supple. No thyromegaly present. Cardiovascular: Normal rate, regular rhythm and normal heart sounds. Pulmonary/Chest: Effort normal and breath sounds normal. No respiratory distress. He has no rales. bilateral rhonchi no wheezing    Abdominal: Soft. Bowel sounds are normal. He exhibits no distension and no mass. There is no tenderness. There is no rebound. Musculoskeletal: Normal range of motion. He exhibits no edema, tenderness or deformity. Lymphadenopathy:     He has no cervical adenopathy. Neurological: He is alert and oriented to person, place, and time. No cranial nerve deficit. Coordination normal.   Skin: Skin is warm and dry. No rash noted. He is not diaphoretic. No erythema.    Psychiatric: He has a normal mood and affect. Judgment and thought content normal.   Nursing note and vitals reviewed. Assessment and plan     Plan of care has been discussed with the patient, he agrees to the plan and verbalized understanding. All his questions were answered  More than 50% of the time spent in this visit was counseling the patient about  illness and treatment options         1. Dizziness    - glucose blood VI test strips (ONETOUCH VERIO) strip; Pt is checking his blood sugars 1-2 times a day. Dispense: 50 Strip; Refill: 1  - METABOLIC PANEL, COMPREHENSIVE; Future    2. Hypoglycemia    - glucose blood VI test strips (ONETOUCH VERIO) strip; Pt is checking his blood sugars 1-2 times a day. Dispense: 50 Strip; Refill: 1    3. Mixed hyperlipidemia  Patient is on Lipitor 20 mg daily  - METABOLIC PANEL, COMPREHENSIVE; Future  - LIPID PANEL; Future    7. Prediabetes  Patient is on metformin and lifestyle modification last hemoglobin A1c  - HEMOGLOBIN A1C W/O EAG; Future  - METABOLIC PANEL, COMPREHENSIVE; Future    8. Elevated BP without diagnosis of hypertension  Blood pressure is normal today  - METABOLIC PANEL, COMPREHENSIVE; Future    9. Acute bronchitis, unspecified organism  We will start the patient on antibiotic she has been sick for over 2weeks history of smoking. Advised about increase hydration and continue symptomatic treatment  - amoxicillin-clavulanate (AUGMENTIN) 875-125 mg per tablet; Take 1 Tab by mouth every twelve (12) hours for 10 days. Dispense: 20 Tab; Refill: 0  - guaiFENesin ER (MUCINEX) 600 mg ER tablet; Take 1 Tab by mouth two (2) times a day. Dispense: 20 Tab; Refill: 0    10. Bronchitis    - amoxicillin-clavulanate (AUGMENTIN) 875-125 mg per tablet; Take 1 Tab by mouth every twelve (12) hours for 10 days. Dispense: 20 Tab; Refill: 0    Current Outpatient Medications   Medication Sig Dispense Refill    cholecalciferol, vitamin D3, (VITAMIN D3 PO) Take 1 Tab by mouth daily.       ascorbic acid (SUPER C PO) Take 1 Tab by mouth daily.  FIBER-CAPS, PSYLLIUM HUSK, PO Take 5 Caps by mouth daily.  glucose blood VI test strips (ONETOUCH VERIO) strip Pt is checking his blood sugars 1-2 times a day. 50 Strip 1    amoxicillin-clavulanate (AUGMENTIN) 875-125 mg per tablet Take 1 Tab by mouth every twelve (12) hours for 10 days. 20 Tab 0    guaiFENesin ER (MUCINEX) 600 mg ER tablet Take 1 Tab by mouth two (2) times a day. 20 Tab 0    metFORMIN (GLUCOPHAGE) 500 mg tablet Take 1 Tab by mouth two (2) times daily (with meals). (Patient taking differently: Take 500 mg by mouth daily (with breakfast). ) 180 Tab 1    atorvastatin (LIPITOR) 20 mg tablet Take 1 Tab by mouth daily. 90 Tab 1    escitalopram oxalate (LEXAPRO) 5 mg tablet Take 1 Tab by mouth daily. Indications: Anxiousness associated with Depression 90 Tab 1    omeprazole (PRILOSEC) 20 mg capsule Take 1 Cap by mouth daily. 90 Cap 1    clotrimazole-betamethasone (LOTRISONE) topical cream Apply thin layer to affected area twice daily for 7 days. 15 g 0    naproxen (NAPROSYN) 500 mg tablet Take 1 Tab by mouth two (2) times daily (with meals). 60 Tab 0    multivitamin (ONE A DAY) tablet Take 1 Tab by mouth daily.  aspirin delayed-release 81 mg tablet Take 81 mg by mouth daily.  B.infantis-B.ani-B.long-B.bifi (PROBIOTIC 4X) 10-15 mg TbEC Take  by mouth.  Lancets misc Pt is checking his blood sugars 1-2 times a day. 50 Each 0    cholecalciferol, vitamin D3, 50,000 unit tab Take 1 Tab by mouth every seven (7) days for 8 doses.  8 Tab 0       Patient Active Problem List    Diagnosis Date Noted    Anxiety 02/13/2019    Gastroesophageal reflux disease without esophagitis 06/02/2018    Depression 05/25/2017    Precordial pain 10/28/2016    Family history of premature CAD 10/28/2016    Mixed hyperlipidemia 10/26/2016     Results for orders placed or performed during the hospital encounter of 02/13/19   CBC WITH AUTOMATED DIFF Result Value Ref Range    WBC 6.3 4.6 - 13.2 K/uL    RBC 4.97 4.70 - 5.50 M/uL    HGB 15.5 13.0 - 16.0 g/dL    HCT 48.3 (H) 36.0 - 48.0 %    MCV 97.2 (H) 74.0 - 97.0 FL    MCH 31.2 24.0 - 34.0 PG    MCHC 32.1 31.0 - 37.0 g/dL    RDW 13.6 11.6 - 14.5 %    PLATELET 298 721 - 722 K/uL    MPV 11.0 9.2 - 11.8 FL    NEUTROPHILS 53 40 - 73 %    LYMPHOCYTES 37 21 - 52 %    MONOCYTES 8 3 - 10 %    EOSINOPHILS 1 0 - 5 %    BASOPHILS 1 0 - 2 %    ABS. NEUTROPHILS 3.4 1.8 - 8.0 K/UL    ABS. LYMPHOCYTES 2.3 0.9 - 3.6 K/UL    ABS. MONOCYTES 0.5 0.05 - 1.2 K/UL    ABS. EOSINOPHILS 0.1 0.0 - 0.4 K/UL    ABS. BASOPHILS 0.1 0.0 - 0.1 K/UL    DF AUTOMATED     LIPID PANEL   Result Value Ref Range    LIPID PROFILE          Cholesterol, total 225 (H) <200 MG/DL    Triglyceride 184 (H) <150 MG/DL    HDL Cholesterol 45 40 - 60 MG/DL    LDL, calculated 143.2 (H) 0 - 100 MG/DL    VLDL, calculated 36.8 MG/DL    CHOL/HDL Ratio 5.0 0 - 5.0     METABOLIC PANEL, COMPREHENSIVE   Result Value Ref Range    Sodium 139 136 - 145 mmol/L    Potassium 4.4 3.5 - 5.5 mmol/L    Chloride 105 100 - 108 mmol/L    CO2 27 21 - 32 mmol/L    Anion gap 7 3.0 - 18 mmol/L    Glucose 95 74 - 99 mg/dL    BUN 17 7.0 - 18 MG/DL    Creatinine 1.01 0.6 - 1.3 MG/DL    BUN/Creatinine ratio 17 12 - 20      GFR est AA >60 >60 ml/min/1.73m2    GFR est non-AA >60 >60 ml/min/1.73m2    Calcium 8.7 8.5 - 10.1 MG/DL    Bilirubin, total 0.6 0.2 - 1.0 MG/DL    ALT (SGPT) 185 (H) 16 - 61 U/L    AST (SGOT) 83 (H) 15 - 37 U/L    Alk.  phosphatase 93 45 - 117 U/L    Protein, total 7.9 6.4 - 8.2 g/dL    Albumin 4.3 3.4 - 5.0 g/dL    Globulin 3.6 2.0 - 4.0 g/dL    A-G Ratio 1.2 0.8 - 1.7     HEMOGLOBIN A1C W/O EAG   Result Value Ref Range    Hemoglobin A1c 6.1 (H) 4.2 - 5.6 %   URINALYSIS W/MICROSCOPIC   Result Value Ref Range    Color YELLOW      Appearance CLEAR      Specific gravity 1.017 1.005 - 1.030      pH (UA) 6.0 5.0 - 8.0      Protein NEGATIVE  NEG mg/dL    Glucose NEGATIVE  NEG mg/dL    Ketone NEGATIVE  NEG mg/dL    Bilirubin NEGATIVE  NEG      Blood NEGATIVE  NEG      Urobilinogen 0.2 0.2 - 1.0 EU/dL    Nitrites NEGATIVE  NEG      Leukocyte Esterase NEGATIVE  NEG      WBC 0 0 - 4 /hpf    RBC 0 0 - 5 /hpf    Epithelial cells FEW 0 - 5 /lpf    Bacteria NEGATIVE  NEG /hpf     No visits with results within 3 Month(s) from this visit. Latest known visit with results is:   Hospital Outpatient Visit on 02/13/2019   Component Date Value Ref Range Status    WBC 02/13/2019 6.3  4.6 - 13.2 K/uL Final    RBC 02/13/2019 4.97  4.70 - 5.50 M/uL Final    HGB 02/13/2019 15.5  13.0 - 16.0 g/dL Final    HCT 02/13/2019 48.3* 36.0 - 48.0 % Final    MCV 02/13/2019 97.2* 74.0 - 97.0 FL Final    MCH 02/13/2019 31.2  24.0 - 34.0 PG Final    MCHC 02/13/2019 32.1  31.0 - 37.0 g/dL Final    RDW 02/13/2019 13.6  11.6 - 14.5 % Final    PLATELET 17/66/2805 933  135 - 420 K/uL Final    MPV 02/13/2019 11.0  9.2 - 11.8 FL Final    NEUTROPHILS 02/13/2019 53  40 - 73 % Final    LYMPHOCYTES 02/13/2019 37  21 - 52 % Final    MONOCYTES 02/13/2019 8  3 - 10 % Final    EOSINOPHILS 02/13/2019 1  0 - 5 % Final    BASOPHILS 02/13/2019 1  0 - 2 % Final    ABS. NEUTROPHILS 02/13/2019 3.4  1.8 - 8.0 K/UL Final    ABS. LYMPHOCYTES 02/13/2019 2.3  0.9 - 3.6 K/UL Final    ABS. MONOCYTES 02/13/2019 0.5  0.05 - 1.2 K/UL Final    ABS. EOSINOPHILS 02/13/2019 0.1  0.0 - 0.4 K/UL Final    ABS. BASOPHILS 02/13/2019 0.1  0.0 - 0.1 K/UL Final    DF 02/13/2019 AUTOMATED    Final    LIPID PROFILE 02/13/2019        Final    Cholesterol, total 02/13/2019 225* <200 MG/DL Final    Triglyceride 02/13/2019 184* <150 MG/DL Final    Comment: The drugs N-acetylcysteine (NAC) and  Metamiszole have been found to cause falsely  low results in this chemical assay. Please  be sure to submit blood samples obtained  BEFORE administration of either of these  drugs to assure correct results.       HDL Cholesterol 02/13/2019 45  40 - 60 MG/DL Final    LDL, calculated 02/13/2019 143.2* 0 - 100 MG/DL Final    VLDL, calculated 02/13/2019 36.8  MG/DL Final    CHOL/HDL Ratio 02/13/2019 5.0  0 - 5.0   Final    Sodium 02/13/2019 139  136 - 145 mmol/L Final    Potassium 02/13/2019 4.4  3.5 - 5.5 mmol/L Final    Chloride 02/13/2019 105  100 - 108 mmol/L Final    CO2 02/13/2019 27  21 - 32 mmol/L Final    Anion gap 02/13/2019 7  3.0 - 18 mmol/L Final    Glucose 02/13/2019 95  74 - 99 mg/dL Final    BUN 02/13/2019 17  7.0 - 18 MG/DL Final    Creatinine 02/13/2019 1.01  0.6 - 1.3 MG/DL Final    BUN/Creatinine ratio 02/13/2019 17  12 - 20   Final    GFR est AA 02/13/2019 >60  >60 ml/min/1.73m2 Final    GFR est non-AA 02/13/2019 >60  >60 ml/min/1.73m2 Final    Comment: (NOTE)  Estimated GFR is calculated using the Modification of Diet in Renal   Disease (MDRD) Study equation, reported for both  Americans   (GFRAA) and non- Americans (GFRNA), and normalized to 1.73m2   body surface area. The physician must decide which value applies to   the patient. The MDRD study equation should only be used in   individuals age 25 or older. It has not been validated for the   following: pregnant women, patients with serious comorbid conditions,   or on certain medications, or persons with extremes of body size,   muscle mass, or nutritional status.  Calcium 02/13/2019 8.7  8.5 - 10.1 MG/DL Final    Bilirubin, total 02/13/2019 0.6  0.2 - 1.0 MG/DL Final    ALT (SGPT) 02/13/2019 185* 16 - 61 U/L Final    AST (SGOT) 02/13/2019 83* 15 - 37 U/L Final    Alk.  phosphatase 02/13/2019 93  45 - 117 U/L Final    Protein, total 02/13/2019 7.9  6.4 - 8.2 g/dL Final    Albumin 02/13/2019 4.3  3.4 - 5.0 g/dL Final    Globulin 02/13/2019 3.6  2.0 - 4.0 g/dL Final    A-G Ratio 02/13/2019 1.2  0.8 - 1.7   Final    Hemoglobin A1c 02/13/2019 6.1* 4.2 - 5.6 % Final    Comment: (NOTE)  HbA1C Interpretive Ranges  <5.7              Normal  5.7 - 6.4 Consider Prediabetes  >6.5              Consider Diabetes      Color 02/13/2019 YELLOW    Final    Appearance 02/13/2019 CLEAR    Final    Specific gravity 02/13/2019 1.017  1.005 - 1.030   Final    pH (UA) 02/13/2019 6.0  5.0 - 8.0   Final    Protein 02/13/2019 NEGATIVE   NEG mg/dL Final    Glucose 02/13/2019 NEGATIVE   NEG mg/dL Final    Ketone 02/13/2019 NEGATIVE   NEG mg/dL Final    Bilirubin 02/13/2019 NEGATIVE   NEG   Final    Blood 02/13/2019 NEGATIVE   NEG   Final    Urobilinogen 02/13/2019 0.2  0.2 - 1.0 EU/dL Final    Nitrites 02/13/2019 NEGATIVE   NEG   Final    Leukocyte Esterase 02/13/2019 NEGATIVE   NEG   Final    WBC 02/13/2019 0  0 - 4 /hpf Final    RBC 02/13/2019 0  0 - 5 /hpf Final    Epithelial cells 02/13/2019 FEW  0 - 5 /lpf Final    Bacteria 02/13/2019 NEGATIVE   NEG /hpf Final          Follow-up and Dispositions    · Return for has appointment in 3 weeks .

## 2019-10-14 DIAGNOSIS — E78.2 MIXED HYPERLIPIDEMIA: ICD-10-CM

## 2019-10-14 DIAGNOSIS — F32.89 OTHER DEPRESSION: ICD-10-CM

## 2019-10-14 DIAGNOSIS — F41.9 ANXIETY: ICD-10-CM

## 2019-10-14 RX ORDER — ATORVASTATIN CALCIUM 20 MG/1
20 TABLET, FILM COATED ORAL DAILY
Qty: 90 TAB | Refills: 1 | Status: SHIPPED | OUTPATIENT
Start: 2019-10-14 | End: 2020-03-11

## 2019-10-14 RX ORDER — ESCITALOPRAM OXALATE 5 MG/1
5 TABLET ORAL DAILY
Qty: 90 TAB | Refills: 1 | Status: SHIPPED | OUTPATIENT
Start: 2019-10-14 | End: 2020-02-03 | Stop reason: SDUPTHER

## 2019-10-24 DIAGNOSIS — K21.9 GASTROESOPHAGEAL REFLUX DISEASE WITHOUT ESOPHAGITIS: ICD-10-CM

## 2019-10-24 RX ORDER — OMEPRAZOLE 20 MG/1
20 CAPSULE, DELAYED RELEASE ORAL DAILY
Qty: 90 CAP | Refills: 1 | Status: SHIPPED | OUTPATIENT
Start: 2019-10-24 | End: 2020-02-24 | Stop reason: DRUGHIGH

## 2019-10-25 ENCOUNTER — OFFICE VISIT (OUTPATIENT)
Dept: FAMILY MEDICINE CLINIC | Age: 54
End: 2019-10-25

## 2019-10-25 ENCOUNTER — HOSPITAL ENCOUNTER (OUTPATIENT)
Dept: LAB | Age: 54
Discharge: HOME | End: 2019-10-25
Payer: COMMERCIAL

## 2019-10-25 VITALS
TEMPERATURE: 97.7 F | DIASTOLIC BLOOD PRESSURE: 85 MMHG | OXYGEN SATURATION: 97 % | HEIGHT: 65 IN | BODY MASS INDEX: 33.39 KG/M2 | RESPIRATION RATE: 20 BRPM | WEIGHT: 200.4 LBS | HEART RATE: 65 BPM | SYSTOLIC BLOOD PRESSURE: 130 MMHG

## 2019-10-25 DIAGNOSIS — E78.2 MIXED HYPERLIPIDEMIA: ICD-10-CM

## 2019-10-25 DIAGNOSIS — R42 DIZZINESS: ICD-10-CM

## 2019-10-25 DIAGNOSIS — K21.9 GASTROESOPHAGEAL REFLUX DISEASE WITHOUT ESOPHAGITIS: ICD-10-CM

## 2019-10-25 DIAGNOSIS — F41.9 ANXIETY: Primary | ICD-10-CM

## 2019-10-25 DIAGNOSIS — R73.03 PREDIABETES: ICD-10-CM

## 2019-10-25 DIAGNOSIS — R03.0 ELEVATED BP WITHOUT DIAGNOSIS OF HYPERTENSION: ICD-10-CM

## 2019-10-25 DIAGNOSIS — F32.0 CURRENT MILD EPISODE OF MAJOR DEPRESSIVE DISORDER WITHOUT PRIOR EPISODE (HCC): ICD-10-CM

## 2019-10-25 LAB
ALBUMIN SERPL-MCNC: 4.3 G/DL (ref 3.4–5)
ALBUMIN/GLOB SERPL: 1.5 {RATIO} (ref 0.8–1.7)
ALP SERPL-CCNC: 87 U/L (ref 45–117)
ALT SERPL-CCNC: 116 U/L (ref 16–61)
ANION GAP SERPL CALC-SCNC: 6 MMOL/L (ref 3–18)
AST SERPL-CCNC: 65 U/L (ref 10–38)
BILIRUB SERPL-MCNC: 0.4 MG/DL (ref 0.2–1)
BUN SERPL-MCNC: 15 MG/DL (ref 7–18)
BUN/CREAT SERPL: 16 (ref 12–20)
CALCIUM SERPL-MCNC: 8.5 MG/DL (ref 8.5–10.1)
CHLORIDE SERPL-SCNC: 106 MMOL/L (ref 100–111)
CO2 SERPL-SCNC: 27 MMOL/L (ref 21–32)
CREAT SERPL-MCNC: 0.96 MG/DL (ref 0.6–1.3)
GLOBULIN SER CALC-MCNC: 2.8 G/DL (ref 2–4)
GLUCOSE SERPL-MCNC: 90 MG/DL (ref 74–99)
HBA1C MFR BLD: 6.5 % (ref 4.2–5.6)
POTASSIUM SERPL-SCNC: 4 MMOL/L (ref 3.5–5.5)
PROT SERPL-MCNC: 7.1 G/DL (ref 6.4–8.2)
SODIUM SERPL-SCNC: 139 MMOL/L (ref 136–145)

## 2019-10-25 PROCEDURE — 36415 COLL VENOUS BLD VENIPUNCTURE: CPT

## 2019-10-25 PROCEDURE — 83036 HEMOGLOBIN GLYCOSYLATED A1C: CPT

## 2019-10-25 PROCEDURE — 80061 LIPID PANEL: CPT

## 2019-10-25 PROCEDURE — 80053 COMPREHEN METABOLIC PANEL: CPT

## 2019-10-25 RX ORDER — METFORMIN HYDROCHLORIDE 500 MG/1
500 TABLET ORAL 2 TIMES DAILY WITH MEALS
Qty: 180 TAB | Refills: 1 | Status: SHIPPED | OUTPATIENT
Start: 2019-10-25

## 2019-10-25 RX ORDER — METHYLPREDNISOLONE 4 MG/1
TABLET ORAL
COMMUNITY
Start: 2019-10-13 | End: 2019-10-25

## 2019-10-25 NOTE — PROGRESS NOTES
Gisel Prado is a 48 y.o. male presents in office for cholesterol and prediabetes. Health Maintenance Due   Topic Date Due    DTaP/Tdap/Td series (1 - Tdap) 08/17/1988    Shingrix Vaccine Age 50> (1 of 2) 08/17/2017     1. Have you been to the ER, urgent care clinic since your last visit? Hospitalized since your last visit?no    2. Have you seen or consulted any other health care providers outside of the [address] [place] [address] since your last visit? Include any pap smears or colon screening. No    Patient declines flu vaccine.

## 2019-10-25 NOTE — PROGRESS NOTES
Claudio Chávez     Chief Complaint   Patient presents with    Cholesterol Problem     f/u    Other     pre diabetes f/u     Vitals:    10/25/19 1010   BP: 130/85   Pulse: 65   Resp: 20   Temp: 97.7 °F (36.5 °C)   TempSrc: Oral   SpO2: 97%   Weight: 200 lb 6.4 oz (90.9 kg)   Height: 5' 5\" (1.651 m)   PainSc:   0 - No pain         HPI: Patient is here for follow-up he is accompanied by his wife, he has no acute complaint he is adherent to all medication    Blood work was done today. No acute changes since last visit in the hospital admission        Past Medical History:   Diagnosis Date    Exposure to chemical pollution     Hypercholesterolemia     Migraine      Past Surgical History:   Procedure Laterality Date    HX KNEE ARTHROSCOPY Left     HX OPEN REDUCTION INTERNAL FIXATION Right     pins one surgery; pins removed on a separate surgery    HX OTHER SURGICAL      Total top teeth removal and removable plate installed    HX VASECTOMY      HX WISDOM TEETH EXTRACTION       Social History     Tobacco Use    Smoking status: Never Smoker    Smokeless tobacco: Current User    Tobacco comment: Vape with 0.3 nicotine   Substance Use Topics    Alcohol use: No       Family History   Problem Relation Age of Onset    Hypertension Mother     Cancer Mother         Lymphatic cancer    No Known Problems Father     Heart Disease Maternal Grandmother     Heart Disease Maternal Grandfather     Cancer Maternal Grandfather         Lung Cancer       Review of Systems   Constitutional: Negative for chills, fever, malaise/fatigue and weight loss. HENT: Negative for congestion, ear discharge, ear pain, hearing loss, nosebleeds, sinus pain and sore throat. Eyes: Negative for blurred vision, double vision and discharge. Respiratory: Negative for cough, hemoptysis, sputum production and shortness of breath. Cardiovascular: Negative for chest pain, palpitations, claudication and leg swelling.    Gastrointestinal: Negative for abdominal pain, constipation, diarrhea, nausea and vomiting. Genitourinary: Negative for dysuria, frequency and urgency. Musculoskeletal: Negative for myalgias. Skin: Negative for itching and rash. Neurological: Negative for dizziness, tingling, sensory change, speech change, focal weakness, weakness and headaches. Psychiatric/Behavioral: Negative for depression and suicidal ideas. Physical Exam   Constitutional: He is oriented to person, place, and time. He appears well-developed and well-nourished. No distress. HENT:   Head: Normocephalic and atraumatic. Mouth/Throat: No oropharyngeal exudate. Eyes: Pupils are equal, round, and reactive to light. Conjunctivae are normal. Right eye exhibits no discharge. Left eye exhibits no discharge. No scleral icterus. Neck: Normal range of motion. Neck supple. No thyromegaly present. Cardiovascular: Normal rate, regular rhythm and normal heart sounds. Pulmonary/Chest: Effort normal and breath sounds normal. No respiratory distress. He has no rales. Abdominal: Soft. Bowel sounds are normal. He exhibits no distension and no mass. There is no tenderness. There is no rebound. Musculoskeletal: Normal range of motion. He exhibits no edema, tenderness or deformity. Lymphadenopathy:     He has no cervical adenopathy. Neurological: He is alert and oriented to person, place, and time. No cranial nerve deficit. Coordination normal.   Skin: Skin is warm and dry. No rash noted. He is not diaphoretic. No erythema. Psychiatric: He has a normal mood and affect. Judgment and thought content normal.   Nursing note and vitals reviewed. Assessment and plan     Plan of care has been discussed with the patient, he agrees to the plan and verbalized understanding. All his questions were answered  More than 50% of the time spent in this visit was counseling the patient about  illness and treatment options         1.  Gastroesophageal reflux disease without esophagitis  Stable on omeprazole 20 mg daily    2. Prediabetes  Continue metformin 500 mg daily and lifestyle modifications    Last hemoglobin A1c 6.1    Patient has lost 5 pounds since last visit  - metFORMIN (GLUCOPHAGE) 500 mg tablet; Take 1 Tab by mouth two (2) times daily (with meals). Dispense: 180 Tab; Refill: 1    3. Anxiety  Stable on Lexapro 5 mg daily    4. Current mild episode of major depressive disorder without prior episode (HCC)  Stable on Lexapro 5 mg daily    5. Mixed hyperlipidemia  Patient is on Lipitor 20 mg daily    LDL was 143 last time we will wait for the results that was done today    Current Outpatient Medications   Medication Sig Dispense Refill    metFORMIN (GLUCOPHAGE) 500 mg tablet Take 1 Tab by mouth two (2) times daily (with meals). 180 Tab 1    omeprazole (PRILOSEC) 20 mg capsule Take 1 Cap by mouth daily. 90 Cap 1    escitalopram oxalate (LEXAPRO) 5 mg tablet Take 1 Tab by mouth daily. Indications: Anxiousness associated with Depression 90 Tab 1    atorvastatin (LIPITOR) 20 mg tablet Take 1 Tab by mouth daily. 90 Tab 1    cholecalciferol, vitamin D3, (VITAMIN D3 PO) Take 1 Tab by mouth daily.  ascorbic acid (SUPER C PO) Take 1 Tab by mouth daily.  FIBER-CAPS, PSYLLIUM HUSK, PO Take 5 Caps by mouth daily.  glucose blood VI test strips (ONETOUCH VERIO) strip Pt is checking his blood sugars 1-2 times a day. 50 Strip 1    multivitamin (ONE A DAY) tablet Take 1 Tab by mouth daily.  aspirin delayed-release 81 mg tablet Take 81 mg by mouth daily.  B.infantis-B.ani-B.long-B.bifi (PROBIOTIC 4X) 10-15 mg TbEC Take  by mouth.  Lancets misc Pt is checking his blood sugars 1-2 times a day.  48 Each 0       Patient Active Problem List    Diagnosis Date Noted    Anxiety 02/13/2019    Gastroesophageal reflux disease without esophagitis 06/02/2018    Depression 05/25/2017    Precordial pain 10/28/2016    Family history of premature CAD 10/28/2016    Mixed hyperlipidemia 10/26/2016     Results for orders placed or performed during the hospital encounter of 02/13/19   CBC WITH AUTOMATED DIFF   Result Value Ref Range    WBC 6.3 4.6 - 13.2 K/uL    RBC 4.97 4.70 - 5.50 M/uL    HGB 15.5 13.0 - 16.0 g/dL    HCT 48.3 (H) 36.0 - 48.0 %    MCV 97.2 (H) 74.0 - 97.0 FL    MCH 31.2 24.0 - 34.0 PG    MCHC 32.1 31.0 - 37.0 g/dL    RDW 13.6 11.6 - 14.5 %    PLATELET 818 290 - 073 K/uL    MPV 11.0 9.2 - 11.8 FL    NEUTROPHILS 53 40 - 73 %    LYMPHOCYTES 37 21 - 52 %    MONOCYTES 8 3 - 10 %    EOSINOPHILS 1 0 - 5 %    BASOPHILS 1 0 - 2 %    ABS. NEUTROPHILS 3.4 1.8 - 8.0 K/UL    ABS. LYMPHOCYTES 2.3 0.9 - 3.6 K/UL    ABS. MONOCYTES 0.5 0.05 - 1.2 K/UL    ABS. EOSINOPHILS 0.1 0.0 - 0.4 K/UL    ABS. BASOPHILS 0.1 0.0 - 0.1 K/UL    DF AUTOMATED     LIPID PANEL   Result Value Ref Range    LIPID PROFILE          Cholesterol, total 225 (H) <200 MG/DL    Triglyceride 184 (H) <150 MG/DL    HDL Cholesterol 45 40 - 60 MG/DL    LDL, calculated 143.2 (H) 0 - 100 MG/DL    VLDL, calculated 36.8 MG/DL    CHOL/HDL Ratio 5.0 0 - 5.0     METABOLIC PANEL, COMPREHENSIVE   Result Value Ref Range    Sodium 139 136 - 145 mmol/L    Potassium 4.4 3.5 - 5.5 mmol/L    Chloride 105 100 - 108 mmol/L    CO2 27 21 - 32 mmol/L    Anion gap 7 3.0 - 18 mmol/L    Glucose 95 74 - 99 mg/dL    BUN 17 7.0 - 18 MG/DL    Creatinine 1.01 0.6 - 1.3 MG/DL    BUN/Creatinine ratio 17 12 - 20      GFR est AA >60 >60 ml/min/1.73m2    GFR est non-AA >60 >60 ml/min/1.73m2    Calcium 8.7 8.5 - 10.1 MG/DL    Bilirubin, total 0.6 0.2 - 1.0 MG/DL    ALT (SGPT) 185 (H) 16 - 61 U/L    AST (SGOT) 83 (H) 15 - 37 U/L    Alk.  phosphatase 93 45 - 117 U/L    Protein, total 7.9 6.4 - 8.2 g/dL    Albumin 4.3 3.4 - 5.0 g/dL    Globulin 3.6 2.0 - 4.0 g/dL    A-G Ratio 1.2 0.8 - 1.7     HEMOGLOBIN A1C W/O EAG   Result Value Ref Range    Hemoglobin A1c 6.1 (H) 4.2 - 5.6 %   URINALYSIS W/MICROSCOPIC   Result Value Ref Range Color YELLOW      Appearance CLEAR      Specific gravity 1.017 1.005 - 1.030      pH (UA) 6.0 5.0 - 8.0      Protein NEGATIVE  NEG mg/dL    Glucose NEGATIVE  NEG mg/dL    Ketone NEGATIVE  NEG mg/dL    Bilirubin NEGATIVE  NEG      Blood NEGATIVE  NEG      Urobilinogen 0.2 0.2 - 1.0 EU/dL    Nitrites NEGATIVE  NEG      Leukocyte Esterase NEGATIVE  NEG      WBC 0 0 - 4 /hpf    RBC 0 0 - 5 /hpf    Epithelial cells FEW 0 - 5 /lpf    Bacteria NEGATIVE  NEG /hpf     No visits with results within 3 Month(s) from this visit. Latest known visit with results is:   Hospital Outpatient Visit on 02/13/2019   Component Date Value Ref Range Status    WBC 02/13/2019 6.3  4.6 - 13.2 K/uL Final    RBC 02/13/2019 4.97  4.70 - 5.50 M/uL Final    HGB 02/13/2019 15.5  13.0 - 16.0 g/dL Final    HCT 02/13/2019 48.3* 36.0 - 48.0 % Final    MCV 02/13/2019 97.2* 74.0 - 97.0 FL Final    MCH 02/13/2019 31.2  24.0 - 34.0 PG Final    MCHC 02/13/2019 32.1  31.0 - 37.0 g/dL Final    RDW 02/13/2019 13.6  11.6 - 14.5 % Final    PLATELET 32/02/7219 894  135 - 420 K/uL Final    MPV 02/13/2019 11.0  9.2 - 11.8 FL Final    NEUTROPHILS 02/13/2019 53  40 - 73 % Final    LYMPHOCYTES 02/13/2019 37  21 - 52 % Final    MONOCYTES 02/13/2019 8  3 - 10 % Final    EOSINOPHILS 02/13/2019 1  0 - 5 % Final    BASOPHILS 02/13/2019 1  0 - 2 % Final    ABS. NEUTROPHILS 02/13/2019 3.4  1.8 - 8.0 K/UL Final    ABS. LYMPHOCYTES 02/13/2019 2.3  0.9 - 3.6 K/UL Final    ABS. MONOCYTES 02/13/2019 0.5  0.05 - 1.2 K/UL Final    ABS. EOSINOPHILS 02/13/2019 0.1  0.0 - 0.4 K/UL Final    ABS. BASOPHILS 02/13/2019 0.1  0.0 - 0.1 K/UL Final    DF 02/13/2019 AUTOMATED    Final    LIPID PROFILE 02/13/2019        Final    Cholesterol, total 02/13/2019 225* <200 MG/DL Final    Triglyceride 02/13/2019 184* <150 MG/DL Final    Comment: The drugs N-acetylcysteine (NAC) and  Metamiszole have been found to cause falsely  low results in this chemical assay.  Please  be sure to submit blood samples obtained  BEFORE administration of either of these  drugs to assure correct results.  HDL Cholesterol 02/13/2019 45  40 - 60 MG/DL Final    LDL, calculated 02/13/2019 143.2* 0 - 100 MG/DL Final    VLDL, calculated 02/13/2019 36.8  MG/DL Final    CHOL/HDL Ratio 02/13/2019 5.0  0 - 5.0   Final    Sodium 02/13/2019 139  136 - 145 mmol/L Final    Potassium 02/13/2019 4.4  3.5 - 5.5 mmol/L Final    Chloride 02/13/2019 105  100 - 108 mmol/L Final    CO2 02/13/2019 27  21 - 32 mmol/L Final    Anion gap 02/13/2019 7  3.0 - 18 mmol/L Final    Glucose 02/13/2019 95  74 - 99 mg/dL Final    BUN 02/13/2019 17  7.0 - 18 MG/DL Final    Creatinine 02/13/2019 1.01  0.6 - 1.3 MG/DL Final    BUN/Creatinine ratio 02/13/2019 17  12 - 20   Final    GFR est AA 02/13/2019 >60  >60 ml/min/1.73m2 Final    GFR est non-AA 02/13/2019 >60  >60 ml/min/1.73m2 Final    Comment: (NOTE)  Estimated GFR is calculated using the Modification of Diet in Renal   Disease (MDRD) Study equation, reported for both  Americans   (GFRAA) and non- Americans (GFRNA), and normalized to 1.73m2   body surface area. The physician must decide which value applies to   the patient. The MDRD study equation should only be used in   individuals age 25 or older. It has not been validated for the   following: pregnant women, patients with serious comorbid conditions,   or on certain medications, or persons with extremes of body size,   muscle mass, or nutritional status.  Calcium 02/13/2019 8.7  8.5 - 10.1 MG/DL Final    Bilirubin, total 02/13/2019 0.6  0.2 - 1.0 MG/DL Final    ALT (SGPT) 02/13/2019 185* 16 - 61 U/L Final    AST (SGOT) 02/13/2019 83* 15 - 37 U/L Final    Alk.  phosphatase 02/13/2019 93  45 - 117 U/L Final    Protein, total 02/13/2019 7.9  6.4 - 8.2 g/dL Final    Albumin 02/13/2019 4.3  3.4 - 5.0 g/dL Final    Globulin 02/13/2019 3.6  2.0 - 4.0 g/dL Final    A-G Ratio 02/13/2019 1.2  0.8 - 1.7   Final    Hemoglobin A1c 02/13/2019 6.1* 4.2 - 5.6 % Final    Comment: (NOTE)  HbA1C Interpretive Ranges  <5.7              Normal  5.7 - 6.4         Consider Prediabetes  >6.5              Consider Diabetes      Color 02/13/2019 YELLOW    Final    Appearance 02/13/2019 CLEAR    Final    Specific gravity 02/13/2019 1.017  1.005 - 1.030   Final    pH (UA) 02/13/2019 6.0  5.0 - 8.0   Final    Protein 02/13/2019 NEGATIVE   NEG mg/dL Final    Glucose 02/13/2019 NEGATIVE   NEG mg/dL Final    Ketone 02/13/2019 NEGATIVE   NEG mg/dL Final    Bilirubin 02/13/2019 NEGATIVE   NEG   Final    Blood 02/13/2019 NEGATIVE   NEG   Final    Urobilinogen 02/13/2019 0.2  0.2 - 1.0 EU/dL Final    Nitrites 02/13/2019 NEGATIVE   NEG   Final    Leukocyte Esterase 02/13/2019 NEGATIVE   NEG   Final    WBC 02/13/2019 0  0 - 4 /hpf Final    RBC 02/13/2019 0  0 - 5 /hpf Final    Epithelial cells 02/13/2019 FEW  0 - 5 /lpf Final    Bacteria 02/13/2019 NEGATIVE   NEG /hpf Final          Follow-up and Dispositions    · Return in about 4 months (around 2/25/2020) for for annual physical and follow up 45 minutes .

## 2019-10-26 LAB
CHOLEST SERPL-MCNC: 165 MG/DL
HDLC SERPL-MCNC: 44 MG/DL (ref 40–60)
HDLC SERPL: 3.8 {RATIO} (ref 0–5)
LDLC SERPL CALC-MCNC: 85 MG/DL (ref 0–100)
LIPID PROFILE,FLP: ABNORMAL
TRIGL SERPL-MCNC: 180 MG/DL (ref ?–150)
VLDLC SERPL CALC-MCNC: 36 MG/DL

## 2019-10-30 ENCOUNTER — TELEPHONE (OUTPATIENT)
Dept: FAMILY MEDICINE CLINIC | Age: 54
End: 2019-10-30

## 2019-10-30 NOTE — TELEPHONE ENCOUNTER
----- Message from Kwesi Velez MD sent at 10/30/2019 11:07 AM EDT -----  Cholesterol level has improved as well as LDL    Patient had a slightly elevated triglyceride may be explained with increase of hemoglobin A1c    Hemoglobin A1c 6.5 patient now he is at the diabetes margins we need to increase metformin to 2 tablets twice a day, and also to be strict on his sugar intake as well as carbohydrates      Patient does have elevated liver function that has been from last time but is still elevated    Patient need to get hepatitis screening ultrasound will be ordered patient need to abstain from alcohol drinking

## 2019-10-30 NOTE — TELEPHONE ENCOUNTER
First attempted to contact patient but no answer, a voice message was left requesting patient to call our office at [phone]. Will try again later if patient does not return call.       Cholesterol level has improved as well as LDL     Patient had a slightly elevated triglyceride may be explained with increase of hemoglobin A1c     Hemoglobin A1c 6.5 patient now he is at the diabetes margins we need to increase metformin to 2 tablets twice a day, and also to be strict on his sugar intake as well as carbohydrates       Patient does have elevated liver function that has been from last time but is still elevated     Patient need to get hepatitis screening ultrasound will be ordered patient need to abstain from alcohol drinking

## 2019-10-30 NOTE — TELEPHONE ENCOUNTER
Spoke to patient about results and verified using 2 patient identifiers. Patient acknowledge results given under advisement of PCP. Patient repeats any additional instruction. Patient has no other concerns at this time.     Cholesterol level has improved as well as LDL     Patient had a slightly elevated triglyceride may be explained with increase of hemoglobin A1c     Hemoglobin A1c 6.5 patient now he is at the diabetes margins we need to increase metformin to 2 tablets twice a day, and also to be strict on his sugar intake as well as carbohydrates       Patient does have elevated liver function that has been from last time but is still elevated     Patient need to get hepatitis screening ultrasound will be ordered patient need to abstain from alcohol drinking

## 2019-10-30 NOTE — PROGRESS NOTES
Cholesterol level has improved as well as LDL    Patient had a slightly elevated triglyceride may be explained with increase of hemoglobin A1c    Hemoglobin A1c 6.5 patient now he is at the diabetes margins we need to increase metformin to 2 tablets twice a day, and also to be strict on his sugar intake as well as carbohydrates      Patient does have elevated liver function that has been from last time but is still elevated    Patient need to get hepatitis screening ultrasound will be ordered patient need to abstain from alcohol drinking

## 2019-11-05 NOTE — TELEPHONE ENCOUNTER
Patient called in stating; \"Why does he have to get an ultrasound?' Results were reiterate.     Patient does have elevated liver function that has been from last time but is still elevated     Patient need to get hepatitis screening ultrasound will be ordered patient need to abstain from alcohol drinking

## 2019-11-06 ENCOUNTER — TELEPHONE (OUTPATIENT)
Dept: FAMILY MEDICINE CLINIC | Age: 54
End: 2019-11-06

## 2019-11-06 NOTE — TELEPHONE ENCOUNTER
Patient notified. He states he will decrease Metformin. He will call our office with any additional concerns.

## 2019-11-06 NOTE — TELEPHONE ENCOUNTER
Bloating abdominal pain and diarrhea can be due to metformin patient can decrease his metformin back to 500 mg twice daily    And then  At a later date we will add another medication for his diabetes      But I am not sure about shortness of breath and palpitation, if he is still complaining about the symptoms patient need to be seen, either in the office or urgent care.

## 2019-11-06 NOTE — TELEPHONE ENCOUNTER
Pts wife calling b/c the pt had his metformin increased and is having heart palpitations and stomach issues.  Please call pt asap

## 2019-11-06 NOTE — TELEPHONE ENCOUNTER
Called patient and states he is having some shortness of breath, heart palpitations, extreme tiredness, bloating, diarrhea, and stomach pains.

## 2020-02-03 DIAGNOSIS — F41.9 ANXIETY: ICD-10-CM

## 2020-02-03 DIAGNOSIS — F32.89 OTHER DEPRESSION: ICD-10-CM

## 2020-02-03 NOTE — TELEPHONE ENCOUNTER
Requested Prescriptions     Pending Prescriptions Disp Refills    escitalopram oxalate (LEXAPRO) 5 mg tablet 90 Tab 1     Sig: Take 1 Tab by mouth daily.  Indications: anxiousness associated with depression

## 2020-02-04 RX ORDER — ESCITALOPRAM OXALATE 5 MG/1
5 TABLET ORAL DAILY
Qty: 90 TAB | Refills: 1 | Status: SHIPPED | OUTPATIENT
Start: 2020-02-04 | End: 2020-09-09

## 2020-02-24 ENCOUNTER — OFFICE VISIT (OUTPATIENT)
Dept: FAMILY MEDICINE CLINIC | Age: 55
End: 2020-02-24

## 2020-02-24 ENCOUNTER — HOSPITAL ENCOUNTER (OUTPATIENT)
Dept: LAB | Age: 55
Discharge: HOME | End: 2020-02-24
Payer: COMMERCIAL

## 2020-02-24 VITALS
RESPIRATION RATE: 16 BRPM | BODY MASS INDEX: 35.16 KG/M2 | WEIGHT: 211 LBS | OXYGEN SATURATION: 95 % | DIASTOLIC BLOOD PRESSURE: 96 MMHG | HEART RATE: 75 BPM | HEIGHT: 65 IN | TEMPERATURE: 98.6 F | SYSTOLIC BLOOD PRESSURE: 139 MMHG

## 2020-02-24 DIAGNOSIS — R53.83 FATIGUE, UNSPECIFIED TYPE: ICD-10-CM

## 2020-02-24 DIAGNOSIS — R63.5 WEIGHT GAIN: ICD-10-CM

## 2020-02-24 DIAGNOSIS — Z12.5 PROSTATE CANCER SCREENING: ICD-10-CM

## 2020-02-24 DIAGNOSIS — Z00.00 ANNUAL PHYSICAL EXAM: Primary | ICD-10-CM

## 2020-02-24 DIAGNOSIS — Z00.00 ANNUAL PHYSICAL EXAM: ICD-10-CM

## 2020-02-24 DIAGNOSIS — R79.89 ABNORMAL LFTS: ICD-10-CM

## 2020-02-24 DIAGNOSIS — R73.03 PREDIABETES: ICD-10-CM

## 2020-02-24 DIAGNOSIS — E66.01 SEVERE OBESITY (HCC): ICD-10-CM

## 2020-02-24 DIAGNOSIS — E78.2 MIXED HYPERLIPIDEMIA: ICD-10-CM

## 2020-02-24 DIAGNOSIS — K21.9 GERD WITHOUT ESOPHAGITIS: ICD-10-CM

## 2020-02-24 DIAGNOSIS — F32.89 OTHER DEPRESSION: ICD-10-CM

## 2020-02-24 LAB
ALBUMIN SERPL-MCNC: 4.2 G/DL (ref 3.4–5)
ALBUMIN/GLOB SERPL: 1.1 {RATIO} (ref 0.8–1.7)
ALP SERPL-CCNC: 107 U/L (ref 45–117)
ALT SERPL-CCNC: 150 U/L (ref 16–61)
ANION GAP SERPL CALC-SCNC: 5 MMOL/L (ref 3–18)
APPEARANCE UR: CLEAR
AST SERPL-CCNC: 78 U/L (ref 10–38)
BASOPHILS # BLD: 0 K/UL (ref 0–0.1)
BASOPHILS NFR BLD: 1 % (ref 0–2)
BILIRUB SERPL-MCNC: 0.4 MG/DL (ref 0.2–1)
BILIRUB UR QL: NEGATIVE
BUN SERPL-MCNC: 11 MG/DL (ref 7–18)
BUN/CREAT SERPL: 12 (ref 12–20)
CALCIUM SERPL-MCNC: 9.3 MG/DL (ref 8.5–10.1)
CHLORIDE SERPL-SCNC: 104 MMOL/L (ref 100–111)
CHOLEST SERPL-MCNC: 173 MG/DL
CO2 SERPL-SCNC: 30 MMOL/L (ref 21–32)
COLOR UR: YELLOW
CREAT SERPL-MCNC: 0.95 MG/DL (ref 0.6–1.3)
DIFFERENTIAL METHOD BLD: ABNORMAL
EOSINOPHIL # BLD: 0.2 K/UL (ref 0–0.4)
EOSINOPHIL NFR BLD: 2 % (ref 0–5)
ERYTHROCYTE [DISTWIDTH] IN BLOOD BY AUTOMATED COUNT: 13.4 % (ref 11.6–14.5)
GLOBULIN SER CALC-MCNC: 3.9 G/DL (ref 2–4)
GLUCOSE SERPL-MCNC: 102 MG/DL (ref 74–99)
GLUCOSE UR STRIP.AUTO-MCNC: NEGATIVE MG/DL
HBA1C MFR BLD: 6.7 % (ref 4.2–5.6)
HCT VFR BLD AUTO: 49.1 % (ref 36–48)
HDLC SERPL-MCNC: 36 MG/DL (ref 40–60)
HDLC SERPL: 4.8 {RATIO} (ref 0–5)
HGB BLD-MCNC: 16.1 G/DL (ref 13–16)
HGB UR QL STRIP: NEGATIVE
KETONES UR QL STRIP.AUTO: NEGATIVE MG/DL
LDLC SERPL CALC-MCNC: 79.6 MG/DL (ref 0–100)
LEUKOCYTE ESTERASE UR QL STRIP.AUTO: NEGATIVE
LIPID PROFILE,FLP: ABNORMAL
LYMPHOCYTES # BLD: 2.5 K/UL (ref 0.9–3.6)
LYMPHOCYTES NFR BLD: 31 % (ref 21–52)
MCH RBC QN AUTO: 30.8 PG (ref 24–34)
MCHC RBC AUTO-ENTMCNC: 32.8 G/DL (ref 31–37)
MCV RBC AUTO: 93.9 FL (ref 74–97)
MONOCYTES # BLD: 0.7 K/UL (ref 0.05–1.2)
MONOCYTES NFR BLD: 8 % (ref 3–10)
NEUTS SEG # BLD: 4.6 K/UL (ref 1.8–8)
NEUTS SEG NFR BLD: 58 % (ref 40–73)
NITRITE UR QL STRIP.AUTO: NEGATIVE
PH UR STRIP: 6.5 [PH] (ref 5–8)
PLATELET # BLD AUTO: 250 K/UL (ref 135–420)
PMV BLD AUTO: 11.3 FL (ref 9.2–11.8)
POTASSIUM SERPL-SCNC: 5.2 MMOL/L (ref 3.5–5.5)
PROT SERPL-MCNC: 8.1 G/DL (ref 6.4–8.2)
PROT UR STRIP-MCNC: NEGATIVE MG/DL
PSA SERPL-MCNC: 1.7 NG/ML (ref 0–4)
RBC # BLD AUTO: 5.23 M/UL (ref 4.7–5.5)
SODIUM SERPL-SCNC: 139 MMOL/L (ref 136–145)
SP GR UR REFRACTOMETRY: 1.02 (ref 1–1.03)
T4 FREE SERPL-MCNC: 1 NG/DL (ref 0.7–1.5)
TRIGL SERPL-MCNC: 287 MG/DL (ref ?–150)
TSH SERPL DL<=0.05 MIU/L-ACNC: 1.23 UIU/ML (ref 0.36–3.74)
UROBILINOGEN UR QL STRIP.AUTO: 0.2 EU/DL (ref 0.2–1)
VLDLC SERPL CALC-MCNC: 57.4 MG/DL
WBC # BLD AUTO: 8 K/UL (ref 4.6–13.2)

## 2020-02-24 PROCEDURE — 36415 COLL VENOUS BLD VENIPUNCTURE: CPT

## 2020-02-24 PROCEDURE — 80053 COMPREHEN METABOLIC PANEL: CPT

## 2020-02-24 PROCEDURE — 81003 URINALYSIS AUTO W/O SCOPE: CPT

## 2020-02-24 PROCEDURE — 80061 LIPID PANEL: CPT

## 2020-02-24 PROCEDURE — 84153 ASSAY OF PSA TOTAL: CPT

## 2020-02-24 PROCEDURE — 85025 COMPLETE CBC W/AUTO DIFF WBC: CPT

## 2020-02-24 PROCEDURE — 83036 HEMOGLOBIN GLYCOSYLATED A1C: CPT

## 2020-02-24 PROCEDURE — 84439 ASSAY OF FREE THYROXINE: CPT

## 2020-02-24 RX ORDER — OMEPRAZOLE 40 MG/1
40 CAPSULE, DELAYED RELEASE ORAL DAILY
Qty: 90 CAP | Refills: 1 | Status: SHIPPED | OUTPATIENT
Start: 2020-02-24 | End: 2020-08-06

## 2020-02-24 NOTE — PROGRESS NOTES
Mario Borrero presents today for   Chief Complaint   Patient presents with    Complete Physical    GERD     f/u    Depression     f/u    Other     preDm f/u       Is someone accompanying this pt? yes    Is the patient using any DME equipment during OV? no    Depression Screening:  3 most recent PHQ Screens 10/25/2019   Little interest or pleasure in doing things Not at all   Feeling down, depressed, irritable, or hopeless Not at all   Total Score PHQ 2 0       Learning Assessment:  Learning Assessment 10/25/2019   PRIMARY LEARNER Patient   HIGHEST LEVEL OF EDUCATION - PRIMARY LEARNER  -   BARRIERS PRIMARY LEARNER -   PRIMARY LANGUAGE ENGLISH   LEARNER PREFERENCE PRIMARY PICTURES     LISTENING     READING     DEMONSTRATION     -   ANSWERED BY patient     self   RELATIONSHIP -       Abuse Screening:  Abuse Screening Questionnaire 10/25/2019   Do you ever feel afraid of your partner? N   Are you in a relationship with someone who physically or mentally threatens you? N   Is it safe for you to go home? Y       Fall Risk  Fall Risk Assessment, last 12 mths 10/25/2019   Able to walk? Yes   Fall in past 12 months? No       Health Maintenance reviewed and discussed and ordered per Provider. Health Maintenance Due   Topic Date Due    Foot Exam Q1  08/17/1977    MICROALBUMIN Q1  08/17/1977    Eye Exam Retinal or Dilated  08/17/1977    DTaP/Tdap/Td series (1 - Tdap) 08/17/1978    Shingrix Vaccine Age 50> (1 of 2) 08/17/2017   Patient states he had eye exam April 2019. He follows with doctor. He will call to schedule own appointment. He is unsure when last Tetanus vaccine was. He was instructed to discuss Shingles with pharmacy. Pt currently taking Antiplatelet therapy? ASA    Coordination of Care:  1. Have you been to the ER, urgent care clinic since your last visit? Hospitalized since your last visit? no    2.  Have you seen or consulted any other health care providers outside of the [address] [place] [address] since your last visit? Include any pap smears or colon screening.  no

## 2020-02-24 NOTE — PROGRESS NOTES
Kaveh Vilchis     Chief Complaint   Patient presents with    Complete Physical    GERD     f/u    Depression     f/u    Other     preDm f/u     Vitals:    02/24/20 0723   BP: (!) 139/96   Pulse: 75   Resp: 16   Temp: 98.6 °F (37 °C)   TempSrc: Oral   SpO2: 95%   Weight: 211 lb (95.7 kg)   Height: 5' 5\" (1.651 m)   PainSc:   0 - No pain         HPI: Kaveh Vilchis is here for annual physical exam       Upper abdominal pain for a month not related to food intake he thinks it is related to metformin  He stopped metformin 1 week ago , his pain is better and he is having more energy , he has worsening heartburn and he had to increase his omeprazole from 20 to 40 mg daily to control his symptoms. Also he ah's been tired and sleeping more often he had gained 11 pound ,no physical activities hie work is hand on. He stopped vaping  Vaper  3 month ago . Patient has a sedentary lifestyle he does not exercise at home but his work involves walking and lifting.     He is not adherent with low sugar diet he consumed candies and put sugar on his coffee and tea    Past Medical History:   Diagnosis Date    Exposure to chemical pollution     Hypercholesterolemia     Migraine      Past Surgical History:   Procedure Laterality Date    HX KNEE ARTHROSCOPY Left     HX OPEN REDUCTION INTERNAL FIXATION Right     pins one surgery; pins removed on a separate surgery    HX OTHER SURGICAL      Total top teeth removal and removable plate installed    HX VASECTOMY      HX WISDOM TEETH EXTRACTION       Social History     Tobacco Use    Smoking status: Never Smoker    Smokeless tobacco: Former User    Tobacco comment: Vape with 0 nicotine   Substance Use Topics    Alcohol use: No       Family History   Problem Relation Age of Onset    Hypertension Mother     Cancer Mother         Lymphatic cancer    No Known Problems Father     Heart Disease Maternal Grandmother     Heart Disease Maternal Grandfather     Cancer Maternal Grandfather         Lung Cancer       Review of Systems   Constitutional: Negative for chills, fever, malaise/fatigue and weight loss. HENT: Negative for congestion, ear discharge, ear pain, hearing loss, nosebleeds, sinus pain and sore throat. Eyes: Negative for blurred vision, double vision and discharge. Respiratory: Negative for cough, hemoptysis, sputum production, shortness of breath and wheezing. Cardiovascular: Negative for chest pain, palpitations, claudication and leg swelling. Gastrointestinal: Positive for abdominal pain. Negative for constipation, diarrhea, nausea and vomiting. Genitourinary: Negative for dysuria, frequency and urgency. Musculoskeletal: Negative for back pain, joint pain, myalgias and neck pain. Skin: Negative for itching and rash. Neurological: Negative for dizziness, tingling, sensory change, speech change, focal weakness, weakness and headaches. Psychiatric/Behavioral: Positive for depression. Negative for hallucinations, substance abuse and suicidal ideas. The patient is not nervous/anxious and does not have insomnia. Physical Exam  Constitutional:       General: He is not in acute distress. Appearance: He is well-developed. He is not diaphoretic. HENT:      Head: Normocephalic and atraumatic. Mouth/Throat:      Pharynx: No oropharyngeal exudate. Eyes:      General: No scleral icterus. Right eye: No discharge. Left eye: No discharge. Conjunctiva/sclera: Conjunctivae normal.      Pupils: Pupils are equal, round, and reactive to light. Neck:      Musculoskeletal: Normal range of motion and neck supple. Thyroid: No thyromegaly. Cardiovascular:      Rate and Rhythm: Normal rate and regular rhythm. Heart sounds: Normal heart sounds. Pulmonary:      Effort: Pulmonary effort is normal. No respiratory distress. Breath sounds: Normal breath sounds. No rales.    Abdominal:      General: Bowel sounds are normal. There is no distension. Palpations: Abdomen is soft. There is no mass. Tenderness: There is abdominal tenderness. There is no rebound. Comments: Mild generalized abdominal tenderness with no rebound     Musculoskeletal: Normal range of motion. General: No tenderness or deformity. Lymphadenopathy:      Cervical: No cervical adenopathy. Skin:     General: Skin is warm and dry. Findings: No erythema or rash. Neurological:      Mental Status: He is alert and oriented to person, place, and time. Cranial Nerves: No cranial nerve deficit. Coordination: Coordination normal.   Psychiatric:         Thought Content: Thought content normal.         Judgment: Judgment normal.          Assessment and plan     Plan of care has been discussed with the patient, he agrees to the plan and verbalized understanding. All his questions were answered  More than 50% of the time spent in this visit was counseling the patient about  illness and treatment options         1. Severe obesity ([address]  Lifestyle modification has been discussed in length with patient, he need to incorporate at least 30 minutes of exercise daily to increase as tolerated    2. Annual physical exam    - CBC WITH AUTOMATED DIFF; Future  - METABOLIC PANEL, COMPREHENSIVE; Future  - URINALYSIS W/ RFLX MICROSCOPIC; Future  - LIPID PANEL; Future    3. Prostate cancer screening    - PSA SCREENING (SCREENING); Future    4. Prediabetes    - HEMOGLOBIN A1C W/O EAG; Future    5. Mixed hyperlipidemia    - LIPID PANEL; Future    6. Other depression   stable  On lexapro  5 mg daily     7. Weight gain    - TSH AND FREE T4; Future    8. Fatigue, unspecified type    - TSH AND FREE T4; Future    9. GERD without esophagitis    - omeprazole (PRILOSEC) 40 mg capsule; Take 1 Cap by mouth daily. Dispense: 90 Cap;  Refill: 1  - REFERRAL TO GASTROENTEROLOGY    Current Outpatient Medications   Medication Sig Dispense Refill    omeprazole (PRILOSEC) 40 mg capsule Take 1 Cap by mouth daily. 90 Cap 1    escitalopram oxalate (LEXAPRO) 5 mg tablet Take 1 Tab by mouth daily. Indications: anxiousness associated with depression 90 Tab 1    atorvastatin (LIPITOR) 20 mg tablet Take 1 Tab by mouth daily. 90 Tab 1    cholecalciferol, vitamin D3, (VITAMIN D3 PO) Take 1 Tab by mouth daily.  ascorbic acid (SUPER C PO) Take 1 Tab by mouth daily.  FIBER-CAPS, PSYLLIUM HUSK, PO Take 5 Caps by mouth daily.  glucose blood VI test strips (ONETOUCH VERIO) strip Pt is checking his blood sugars 1-2 times a day. 50 Strip 1    multivitamin (ONE A DAY) tablet Take 1 Tab by mouth daily.  aspirin delayed-release 81 mg tablet Take 81 mg by mouth daily.  B.infantis-B.ani-B.long-B.bifi (PROBIOTIC 4X) 10-15 mg TbEC Take  by mouth.  Lancets misc Pt is checking his blood sugars 1-2 times a day. 50 Each 0    metFORMIN (GLUCOPHAGE) 500 mg tablet Take 1 Tab by mouth two (2) times daily (with meals).  180 Tab 1       Patient Active Problem List    Diagnosis Date Noted    Severe obesity ([facility] [address] 02/24/2020    Anxiety 02/13/2019    Gastroesophageal reflux disease without esophagitis 06/02/2018    Depression 05/25/2017    Precordial pain 10/28/2016    Family history of premature CAD 10/28/2016    Mixed hyperlipidemia 10/26/2016     Results for orders placed or performed during the hospital encounter of 10/25/19   HEMOGLOBIN A1C W/O EAG   Result Value Ref Range    Hemoglobin A1c 6.5 (H) 4.2 - 5.6 %   METABOLIC PANEL, COMPREHENSIVE   Result Value Ref Range    Sodium 139 136 - 145 mmol/L    Potassium 4.0 3.5 - 5.5 mmol/L    Chloride 106 100 - 111 mmol/L    CO2 27 21 - 32 mmol/L    Anion gap 6 3.0 - 18 mmol/L    Glucose 90 74 - 99 mg/dL    BUN 15 7.0 - 18 MG/DL    Creatinine 0.96 0.6 - 1.3 MG/DL    BUN/Creatinine ratio 16 12 - 20      GFR est AA >60 >60 ml/min/1.73m2    GFR est non-AA >60 >60 ml/min/1.73m2    Calcium 8.5 8.5 - 10.1 MG/DL    Bilirubin, total 0.4 0.2 - 1.0 MG/DL    ALT (SGPT) 116 (H) 16 - 61 U/L    AST (SGOT) 65 (H) 10 - 38 U/L    Alk. phosphatase 87 45 - 117 U/L    Protein, total 7.1 6.4 - 8.2 g/dL    Albumin 4.3 3.4 - 5.0 g/dL    Globulin 2.8 2.0 - 4.0 g/dL    A-G Ratio 1.5 0.8 - 1.7     LIPID PANEL   Result Value Ref Range    LIPID PROFILE          Cholesterol, total 165 <200 MG/DL    Triglyceride 180 (H) <150 MG/DL    HDL Cholesterol 44 40 - 60 MG/DL    LDL, calculated 85 0 - 100 MG/DL    VLDL, calculated 36 MG/DL    CHOL/HDL Ratio 3.8 0 - 5.0       No visits with results within 3 Month(s) from this visit. Latest known visit with results is:   Hospital Outpatient Visit on 10/25/2019   Component Date Value Ref Range Status    Hemoglobin A1c 10/25/2019 6.5* 4.2 - 5.6 % Final    Comment: (NOTE)  HbA1C Interpretive Ranges  <5.7              Normal  5.7 - 6.4         Consider Prediabetes  >6.5              Consider Diabetes      Sodium 10/25/2019 139  136 - 145 mmol/L Final    Potassium 10/25/2019 4.0  3.5 - 5.5 mmol/L Final    Chloride 10/25/2019 106  100 - 111 mmol/L Final    CO2 10/25/2019 27  21 - 32 mmol/L Final    Anion gap 10/25/2019 6  3.0 - 18 mmol/L Final    Glucose 10/25/2019 90  74 - 99 mg/dL Final    BUN 10/25/2019 15  7.0 - 18 MG/DL Final    Creatinine 10/25/2019 0.96  0.6 - 1.3 MG/DL Final    BUN/Creatinine ratio 10/25/2019 16  12 - 20   Final    GFR est AA 10/25/2019 >60  >60 ml/min/1.73m2 Final    GFR est non-AA 10/25/2019 >60  >60 ml/min/1.73m2 Final    Comment: (NOTE)  Estimated GFR is calculated using the Modification of Diet in Renal   Disease (MDRD) Study equation, reported for both  Americans   (GFRAA) and non- Americans (GFRNA), and normalized to 1.73m2   body surface area. The physician must decide which value applies to   the patient. The MDRD study equation should only be used in   individuals age 25 or older.  It has not been validated for the   following: pregnant women, patients with serious comorbid conditions, or on certain medications, or persons with extremes of body size,   muscle mass, or nutritional status.  Calcium 10/25/2019 8.5  8.5 - 10.1 MG/DL Final    Bilirubin, total 10/25/2019 0.4  0.2 - 1.0 MG/DL Final    ALT (SGPT) 10/25/2019 116* 16 - 61 U/L Final    AST (SGOT) 10/25/2019 65* 10 - 38 U/L Final    Alk. phosphatase 10/25/2019 87  45 - 117 U/L Final    Protein, total 10/25/2019 7.1  6.4 - 8.2 g/dL Final    Albumin 10/25/2019 4.3  3.4 - 5.0 g/dL Final    Globulin 10/25/2019 2.8  2.0 - 4.0 g/dL Final    A-G Ratio 10/25/2019 1.5  0.8 - 1.7   Final    LIPID PROFILE 10/25/2019        Final    Cholesterol, total 10/25/2019 165  <200 MG/DL Final    Triglyceride 10/25/2019 180* <150 MG/DL Final    Comment: The drugs N-acetylcysteine (NAC) and  Metamiszole have been found to cause falsely  low results in this chemical assay. Please  be sure to submit blood samples obtained  BEFORE administration of either of these  drugs to assure correct results.  HDL Cholesterol 10/25/2019 44  40 - 60 MG/DL Final    LDL, calculated 10/25/2019 85  0 - 100 MG/DL Final    VLDL, calculated 10/25/2019 36  MG/DL Final    CHOL/HDL Ratio 10/25/2019 3.8  0 - 5.0   Final          Follow-up and Dispositions    · Return in about 3 months (around 5/24/2020) for as per results.

## 2020-02-25 ENCOUNTER — TELEPHONE (OUTPATIENT)
Dept: FAMILY MEDICINE CLINIC | Age: 55
End: 2020-02-25

## 2020-02-25 NOTE — PROGRESS NOTES
Patient's wife contacted with results per PCP, verified 2 patient identifiers. She has no further questions or concerns at this time.

## 2020-02-25 NOTE — PROGRESS NOTES
HB a1c is 6.7 which is diabetes range     Elevated liver enzymes to get abdominal ultrasound and get blood work for hepatitis screening     Need to schedule follow up to discuss after Ultrasound is done

## 2020-02-26 ENCOUNTER — HOSPITAL ENCOUNTER (OUTPATIENT)
Dept: ULTRASOUND IMAGING | Age: 55
Discharge: HOME | End: 2020-02-26
Attending: LEGAL MEDICINE
Payer: COMMERCIAL

## 2020-02-26 ENCOUNTER — HOSPITAL ENCOUNTER (OUTPATIENT)
Dept: LAB | Age: 55
Discharge: HOME | End: 2020-02-26
Attending: LEGAL MEDICINE
Payer: COMMERCIAL

## 2020-02-26 DIAGNOSIS — R79.89 ABNORMAL LFTS: ICD-10-CM

## 2020-02-26 PROCEDURE — 76705 ECHO EXAM OF ABDOMEN: CPT

## 2020-02-26 PROCEDURE — 36415 COLL VENOUS BLD VENIPUNCTURE: CPT

## 2020-02-26 PROCEDURE — 86803 HEPATITIS C AB TEST: CPT

## 2020-02-26 PROCEDURE — 87340 HEPATITIS B SURFACE AG IA: CPT

## 2020-02-27 LAB
HBV SURFACE AG SER QL: <0.1 INDEX
HBV SURFACE AG SER QL: NEGATIVE
HCV AB SER IA-ACNC: 0.14 INDEX
HCV AB SERPL QL IA: NEGATIVE
HCV COMMENT,HCGAC: NORMAL

## 2020-02-27 NOTE — PROGRESS NOTES
Abdominal ultrasound showed enlarged liver most due to fatty liver, patient advised to be strict with lifestyle modification increase exercise as tolerated and avoid sugar and significantly decrease carbohydrates

## 2020-03-11 DIAGNOSIS — E78.2 MIXED HYPERLIPIDEMIA: ICD-10-CM

## 2020-03-11 RX ORDER — ATORVASTATIN CALCIUM 20 MG/1
TABLET, FILM COATED ORAL
Qty: 90 TAB | Refills: 1 | Status: SHIPPED | OUTPATIENT
Start: 2020-03-11 | End: 2020-09-09

## 2020-09-07 DIAGNOSIS — F32.89 OTHER DEPRESSION: ICD-10-CM

## 2020-09-07 DIAGNOSIS — F41.9 ANXIETY: ICD-10-CM

## 2020-09-07 DIAGNOSIS — E78.2 MIXED HYPERLIPIDEMIA: ICD-10-CM

## 2020-09-07 NOTE — LETTER
9/10/2020 2:00 PM 
 
Mr. Gian Dover [address] [address] Dear Mr. Mike Fagan: 
 
Luisa Martinez missed you! Please call our office at [phone] and schedule a follow up appointment for your continued care. Sincerely, Koko Figueroa MD

## 2020-09-09 RX ORDER — ESCITALOPRAM OXALATE 5 MG/1
TABLET ORAL
Qty: 90 TAB | Refills: 0 | Status: SHIPPED | OUTPATIENT
Start: 2020-09-09 | End: 2020-12-08

## 2020-09-09 RX ORDER — ATORVASTATIN CALCIUM 20 MG/1
TABLET, FILM COATED ORAL
Qty: 90 TAB | Refills: 0 | Status: SHIPPED | OUTPATIENT
Start: 2020-09-09 | End: 2020-12-08

## 2020-10-15 ENCOUNTER — IMPORTED ENCOUNTER (OUTPATIENT)
Dept: URBAN - METROPOLITAN AREA CLINIC 1 | Facility: CLINIC | Age: 55
End: 2020-10-15

## 2020-10-15 PROBLEM — Z01.00: Noted: 2020-10-15

## 2020-10-15 PROBLEM — H52.4: Noted: 2020-10-15

## 2020-10-15 PROCEDURE — S0620 ROUTINE OPHTHALMOLOGICAL EXA: HCPCS

## 2020-10-15 NOTE — PATIENT DISCUSSION
1.  Routine Exam -- Patient has minimal refractive error. All conditions discussed with patient today. 2.  Presbyopia -- Rx was given for corrective spectacles if indicated. OTC readers recommended. 3.  Nuclear Cataracts OU -- Observe. 4.  DM II (Oral Meds) -- Will return for DM exam in 6 months. Return for an appointment in 6 months 30 (DM Exam) with Dr. Rosalind Karimi.

## 2020-12-06 DIAGNOSIS — F32.89 OTHER DEPRESSION: ICD-10-CM

## 2020-12-06 DIAGNOSIS — E78.2 MIXED HYPERLIPIDEMIA: ICD-10-CM

## 2020-12-06 DIAGNOSIS — F41.9 ANXIETY: ICD-10-CM

## 2020-12-08 RX ORDER — ESCITALOPRAM OXALATE 5 MG/1
TABLET ORAL
Qty: 90 TAB | Refills: 0 | Status: SHIPPED | OUTPATIENT
Start: 2020-12-08

## 2020-12-08 RX ORDER — ATORVASTATIN CALCIUM 20 MG/1
TABLET, FILM COATED ORAL
Qty: 90 TAB | Refills: 0 | Status: SHIPPED | OUTPATIENT
Start: 2020-12-08

## 2021-01-07 ENCOUNTER — TELEPHONE (OUTPATIENT)
Dept: FAMILY MEDICINE CLINIC | Age: 56
End: 2021-01-07

## 2021-01-07 NOTE — TELEPHONE ENCOUNTER
Received notes from recent patient first refill on January 3, 2021    Checking if the patient is feeling better    Patient has not been seen in almost 1 year  No lab work has been done and there is no scheduled appointment!!

## 2021-02-10 NOTE — PATIENT DISCUSSION
"Patient complains of ""floaters"" in their visual field.  Should an increase of floaters or flashes of light begin

## 2021-02-10 NOTE — PATIENT DISCUSSION
Cataracts noted during exam. Patient is currently not bothered by the visual symptoms that occur with cataracts. Review at next appointment.

## 2021-04-13 ENCOUNTER — IMPORTED ENCOUNTER (OUTPATIENT)
Dept: URBAN - METROPOLITAN AREA CLINIC 1 | Facility: CLINIC | Age: 56
End: 2021-04-13

## 2021-04-13 ENCOUNTER — PREPPED CHART (OUTPATIENT)
Dept: URBAN - METROPOLITAN AREA CLINIC 1 | Facility: CLINIC | Age: 56
End: 2021-04-13

## 2021-04-13 PROBLEM — H25.13: Noted: 2021-04-13

## 2021-04-13 PROBLEM — Z79.84: Noted: 2021-04-13

## 2021-04-13 PROBLEM — E11.9: Noted: 2021-04-13

## 2021-04-13 PROCEDURE — 99214 OFFICE O/P EST MOD 30 MIN: CPT

## 2021-04-13 NOTE — PATIENT DISCUSSION
1.  DM Type II (Oral Meds) without sign of diabetic retinopathy and no blot heme on dilated retinal examination today OU No Macular Edema:  Discussed the pathophysiology of diabetes and its effect on the eye and risk of blindness. Stressed the importance of strong glucose control. Advised of importance of at least yearly dilated examinations but to contact us immediately for any problems or concerns. 2. Cataract OU: Progression OU however nonsurgical at this time. Observe for now without intervention. The patient was advised to contact us if any change or worsening of vision Patient deferred [facility] Rx today. Return for an appointment in 6 months 36 with Dr. Ashanti Nicole. Return for an appointment in 1 year 27 with Dr. Ashanti Nicole.

## 2021-08-11 NOTE — PATIENT DISCUSSION
Enlarged cup to disc ratio. Patient understands the needs to follow pressures closely, and regular RNFL to be taken yearly. OCT RNFL performed today and reviewed with patient. Schedule VF 24-2 prior to next visit.

## 2022-02-20 ASSESSMENT — VISUAL ACUITY
OD_SC: 20/20 -1
OS_SC: 20/20 -1

## 2022-02-20 ASSESSMENT — TONOMETRY
OD_IOP_MMHG: 12
OS_IOP_MMHG: 12

## 2022-03-19 PROBLEM — E66.01 SEVERE OBESITY (HCC): Status: ACTIVE | Noted: 2020-02-24

## 2022-03-19 PROBLEM — K21.9 GASTROESOPHAGEAL REFLUX DISEASE WITHOUT ESOPHAGITIS: Status: ACTIVE | Noted: 2018-06-02

## 2022-03-19 PROBLEM — F41.9 ANXIETY: Status: ACTIVE | Noted: 2019-02-13

## 2022-03-20 PROBLEM — F32.A DEPRESSION: Status: ACTIVE | Noted: 2017-05-25

## 2022-04-02 ASSESSMENT — VISUAL ACUITY
OD_SC: J10
OD_CC: J1
OD_CC: 20/20-1
OS_SC: J10
OS_CC: 20/20
OD_CC: 20/20
OS_CC: J1
OS_CC: 20/20-1

## 2022-04-02 ASSESSMENT — TONOMETRY
OS_IOP_MMHG: 16
OD_IOP_MMHG: 15
OS_IOP_MMHG: 12
OD_IOP_MMHG: 12

## 2023-01-31 RX ORDER — ATORVASTATIN CALCIUM 20 MG/1
TABLET, FILM COATED ORAL
COMMUNITY
Start: 2020-12-08

## 2023-01-31 RX ORDER — ASPIRIN 81 MG/1
81 TABLET ORAL DAILY
COMMUNITY

## 2023-01-31 RX ORDER — LANCETS 30 GAUGE
EACH MISCELLANEOUS
COMMUNITY
Start: 2016-10-27

## 2023-01-31 RX ORDER — OMEPRAZOLE 40 MG/1
CAPSULE, DELAYED RELEASE ORAL
COMMUNITY
Start: 2020-08-06

## 2023-01-31 RX ORDER — ESCITALOPRAM OXALATE 5 MG/1
TABLET ORAL
COMMUNITY
Start: 2020-12-08

## 2023-03-14 ENCOUNTER — HOSPITAL ENCOUNTER (EMERGENCY)
Facility: HOSPITAL | Age: 58
Discharge: HOME | End: 2023-03-15
Attending: EMERGENCY MEDICINE
Payer: COMMERCIAL

## 2023-03-14 VITALS
BODY MASS INDEX: 30.53 KG/M2 | SYSTOLIC BLOOD PRESSURE: 173 MMHG | WEIGHT: 190 LBS | HEART RATE: 91 BPM | RESPIRATION RATE: 19 BRPM | OXYGEN SATURATION: 98 % | DIASTOLIC BLOOD PRESSURE: 84 MMHG | TEMPERATURE: 98.1 F | HEIGHT: 66 IN

## 2023-03-14 DIAGNOSIS — T14.8XXA PUNCTURE WOUND: Primary | ICD-10-CM

## 2023-03-14 PROCEDURE — 6360000002 HC RX W HCPCS: Performed by: EMERGENCY MEDICINE

## 2023-03-14 PROCEDURE — 99284 EMERGENCY DEPT VISIT MOD MDM: CPT

## 2023-03-14 PROCEDURE — 90471 IMMUNIZATION ADMIN: CPT | Performed by: EMERGENCY MEDICINE

## 2023-03-14 PROCEDURE — 90715 TDAP VACCINE 7 YRS/> IM: CPT | Performed by: EMERGENCY MEDICINE

## 2023-03-14 RX ORDER — CIPROFLOXACIN 500 MG/1
500 TABLET, FILM COATED ORAL 2 TIMES DAILY
Qty: 20 TABLET | Refills: 0 | Status: SHIPPED | OUTPATIENT
Start: 2023-03-14 | End: 2023-03-24

## 2023-03-14 RX ADMIN — TETANUS TOXOID, REDUCED DIPHTHERIA TOXOID AND ACELLULAR PERTUSSIS VACCINE, ADSORBED 0.5 ML: 5; 2.5; 8; 8; 2.5 SUSPENSION INTRAMUSCULAR at 23:31

## 2023-03-15 ENCOUNTER — APPOINTMENT (OUTPATIENT)
Facility: HOSPITAL | Age: 58
End: 2023-03-15
Payer: COMMERCIAL

## 2023-03-15 PROCEDURE — 73630 X-RAY EXAM OF FOOT: CPT

## 2023-03-15 NOTE — ED TRIAGE NOTES
Pt arrives to ed reporting right foot pain after stepping on a ghada nail at work. Unknown tetanus date.

## 2023-03-15 NOTE — ED PROVIDER NOTES
[facility] EMERGENCY DEPT  EMERGENCY DEPARTMENT ENCOUNTER      Pt Name: Omer Atkinson  MRN: [id number]  Birthdate 1965  Date of evaluation: 3/14/2023  Provider: JEY Redmond    CHIEF COMPLAINT       Chief Complaint   Patient presents with    Foot Injury         HISTORY OF PRESENT ILLNESS   (Location/Symptom, Timing/Onset, Context/Setting, Quality, Duration, Modifying Factors, Severity)  Note limiting factors.   Omer Atkinson is a 57 y.o. male who presents to the emergency department complaint of right foot injury.  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  Uncertain of tetanus.  Wears closed tennis shoes all day at job.    HPI    Nursing Notes were reviewed.    REVIEW OF SYSTEMS    (2-9 systems for level 4, 10 or more for level 5)     Review of Systems   Musculoskeletal:  Positive for arthralgias.   Skin:  Positive for wound.   Allergic/Immunologic: Negative for immunocompromised state.   Neurological:  Negative for weakness and numbness.     Except as noted above the remainder of the review of systems was reviewed and negative.       PAST MEDICAL HISTORY     Past Medical History:   Diagnosis Date    Exposure to chemical pollution     Hypercholesterolemia     Migraine          SURGICAL HISTORY       Past Surgical History:   Procedure Laterality Date    HX OPEN REDUCTION INTERNAL FIXATION Right     pins one surgery; pins removed on a separate surgery    KNEE ARTHROSCOPY Left     OTHER SURGICAL HISTORY      Total top teeth removal and removable plate installed    VASECTOMY      WISDOM TOOTH EXTRACTION           CURRENT MEDICATIONS       Previous Medications    ASPIRIN 81 MG EC TABLET    Take 81 mg by mouth daily    ATORVASTATIN (LIPITOR) 20 MG TABLET    TAKE 1 TABLET DAILY    ESCITALOPRAM (LEXAPRO) 5 MG TABLET    TAKE 1 TABLET DAILY FOR ANXIOUSNESS ASSOCIATED WITH DEPRESSION    LANCETS MISC    Pt is checking his blood sugars 1-2 times a  day.    METFORMIN (GLUCOPHAGE) 500 MG TABLET    Take 500 mg by mouth 2 times daily (with meals)    OMEPRAZOLE (PRILOSEC) 40 MG DELAYED RELEASE CAPSULE    TAKE 1 CAPSULE DAILY       ALLERGIES     Bupropion    FAMILY HISTORY       Family History   Problem Relation Age of Onset    Cancer Maternal Grandfather         Lung Cancer    Hypertension Mother     Cancer Mother         Lymphatic cancer    Heart Disease Maternal Grandmother     No Known Problems Father     Heart Disease Maternal Grandfather           SOCIAL HISTORY       Social History     Socioeconomic History    Marital status:    Tobacco Use    Smoking status: Never    Smokeless tobacco: Former    Tobacco comments:     Quit smoking: Vape with 0 nicotine   Substance and Sexual Activity    Alcohol use: No    Drug use: No       SCREENINGS         [place] Coma Scale  Eye Opening: Spontaneous  Best Verbal Response: Oriented  Best Motor Response: Obeys commands  [place] Coma Scale Score: 15                     CIWA Assessment  BP: (!) 173/84  Heart Rate: 91                 PHYSICAL EXAM    (up to 7 for level 4, 8 or more for level 5)     ED Triage Vitals [03/14/23 2150]   BP Temp Temp src Heart Rate Resp SpO2 Height Weight   (!) 173/84 98.1 °F (36.7 °C) -- 91 19 98 % 5' 6\" (1.676 m) 190 lb (86.2 kg)       Physical Exam  Vitals and nursing note reviewed. Constitutional:       General: He is not in acute distress. Appearance: Normal appearance. He is normal weight. He is not ill-appearing, toxic-appearing or diaphoretic. HENT:      Head: Normocephalic and atraumatic. Nose: Nose normal.      Mouth/Throat:      Mouth: Mucous membranes are moist.   Eyes:      Extraocular Movements: Extraocular movements intact. Cardiovascular:      Rate and Rhythm: Normal rate and regular rhythm. Pulses: Normal pulses. Heart sounds: Normal heart sounds. Pulmonary:      Effort: Pulmonary effort is normal.      Breath sounds: Normal breath sounds.  No wheezing or rales. Abdominal:      General: Abdomen is flat. Palpations: Abdomen is soft. Tenderness: There is no abdominal tenderness. Musculoskeletal:         General: No deformity or signs of injury. Normal range of motion. Cervical back: Normal range of motion and neck supple. No rigidity. Skin:     General: Skin is warm. Findings: Lesion present. Comments: Small puncture wound to plantar midfoot surface nontender not bleeding no erythema. Strong DP PT pulses. Neurological:      Mental Status: He is alert and oriented to person, place, and time. Cranial Nerves: No cranial nerve deficit. Sensory: No sensory deficit. Motor: No weakness. Coordination: Coordination normal.      Gait: Gait normal.   Psychiatric:         Mood and Affect: Mood normal.         Behavior: Behavior normal.         Thought Content: Thought content normal.         Judgment: Judgment normal.       DIAGNOSTIC RESULTS     RADIOLOGY:   Non-plain film images such as CT, Ultrasound and MRI are read by the radiologist. Plain radiographic images are visualized and preliminarily interpreted by the emergency physician with the below findings:        Interpretation per the Radiologist below, if available at the time of this note:    XR FOOT RIGHT (MIN 3 VIEWS)    (Results Pending)         ED BEDSIDE ULTRASOUND:   Performed by ED Physician - none    LABS:  Labs Reviewed - No data to display    All other labs were within normal range or not returned as of this dictation. EMERGENCY DEPARTMENT COURSE and DIFFERENTIAL DIAGNOSIS/MDM:   Vitals:    Vitals:    03/14/23 2150   BP: (!) 173/84   Pulse: 91   Resp: 19   Temp: 98.1 °F (36.7 °C)   SpO2: 98%   Weight: 190 lb (86.2 kg)   Height: 5' 6\" (1.676 m)           Medical Decision Making  Amount and/or Complexity of Data Reviewed  Radiology: ordered. Risk  Prescription drug management.       Prophylactic antibiotics for Pseudomonas coverage sent to preferred pharmacy.  Follow-up with podiatry.  No fracture on x-ray.      REASSESSMENT        FINAL IMPRESSION      1. Puncture wound          DISPOSITION/PLAN   DISPOSITION Decision To Discharge 03/14/2023 11:58:26 PM      PATIENT REFERRED TO:  Tray Jones DPM  [id number]  [address]  [place] [id number] [phone]    Schedule an appointment as soon as possible for a visit in 2 days  As needed, For wound re-check    [facility] EMERGENCY DEPT  [address]  [place] [address]  [phone]    If symptoms worsen return immediately      DISCHARGE MEDICATIONS:  New Prescriptions    CIPROFLOXACIN (CIPRO) 500 MG TABLET    Take 1 tablet by mouth 2 times daily for 10 days     Controlled Substances Monitoring:     No flowsheet data found.    (Please note that portions of this note were completed with a voice recognition program.  Efforts were made to edit the dictations but occasionally words are mis-transcribed.)    JEY Redmond (electronically signed)  Attending Emergency Physician           JEY Alicea  03/15/23 0021

## 2023-10-20 NOTE — PROGRESS NOTES
Willy Gutierres is a 50 y.o.  male and presents with possible skin drug reaction with lipitor. He has since stopped it. It has been a few months since his last labs. He appears to be getting better. He is to drastically reduce his soda consumption. Chief Complaint   Patient presents with    Medication Reaction     itching with lipitor     Subjective: Additional Concerns: none    Patient Active Problem List    Diagnosis Date Noted    Depression 05/25/2017    Precordial pain 10/28/2016    Family history of premature CAD 10/28/2016    Mixed hyperlipidemia 10/26/2016     Current Outpatient Prescriptions   Medication Sig Dispense Refill    escitalopram oxalate (LEXAPRO) 5 mg tablet Take 1 Tab by mouth daily. Indications: ANXIETY WITH DEPRESSION (Patient taking differently: Take 5 mg by mouth every other day. Indications: ANXIETY WITH DEPRESSION) 90 Tab 1    cyclobenzaprine (FLEXERIL) 10 mg tablet Take 1 Tab by mouth three (3) times daily as needed for Muscle Spasm(s). 60 Tab 0    HYDROcodone-acetaminophen (NORCO) 5-325 mg per tablet Take 1 Tab by mouth every six (6) hours as needed for Pain. Max Daily Amount: 4 Tabs. 30 Tab 0    omeprazole (PRILOSEC) 20 mg capsule Take 1 Cap by mouth daily. 90 Cap 1    clotrimazole-betamethasone (LOTRISONE) topical cream Apply thin layer to affected area twice daily for 7 days. 15 g 0    Lancets misc Pt is checking his blood sugars 1-2 times a day. 50 Each 0    glucose blood VI test strips (ONETOUCH VERIO) strip Pt is checking his blood sugars 1-2 times a day. 50 Strip 0    triamcinolone (ARISTOCORT) 0.5 % topical cream Apply  to affected area two (2) times a day. use thin layer 15 g 0    naproxen (NAPROSYN) 500 mg tablet Take 1 Tab by mouth two (2) times daily (with meals). 60 Tab 0    atorvastatin (LIPITOR) 20 mg tablet Take 1 Tab by mouth daily.  30 Tab 5     Allergies   Allergen Reactions    Lipitor [Atorvastatin] Rash    Zyban [Bupropion Hcl] Rash and Swelling     Past Medical History:   Diagnosis Date    Exposure to chemical pollution     Migraine      Past Surgical History:   Procedure Laterality Date    HX KNEE ARTHROSCOPY Left     HX OPEN REDUCTION INTERNAL FIXATION Right     pins one surgery; pins removed on a separate surgery    HX OTHER SURGICAL      Total top teeth removal and removable plate installed    HX VASECTOMY      HX WISDOM TEETH EXTRACTION       Family History   Problem Relation Age of Onset    Hypertension Mother     Cancer Mother      Lymphatic cancer    No Known Problems Father     Heart Disease Maternal Grandmother     Heart Disease Maternal Grandfather     Cancer Maternal Grandfather      Lung Cancer     Social History   Substance Use Topics    Smoking status: Never Smoker    Smokeless tobacco: Current User      Comment: Vape with 0.3 nicotine    Alcohol use No     ROS     General: negative for - chills, fatigue, fever, weight change  Psych: negative for - anxiety, depression, irritability or mood swings  ENT: negative for - headaches, hearing change, nasal congestion, oral lesions, sneezing or sore throat  Heme/ Lymph: negative for - bleeding problems, bruising, pallor or swollen lymph nodes  Endo: negative for - hot flashes, polydipsia/polyuria or temperature intolerance  Resp: negative for - cough, shortness of breath or wheezing  CV: negative for - chest pain, edema or palpitations  GI: negative for - abdominal pain, change in bowel habits, constipation, diarrhea or nausea/vomiting  : negative for - dysuria, hematuria, incontinence, pelvic pain or vulvar/vaginal symptoms  MSK: negative for - joint pain, joint swelling or muscle pain  Neuro: negative for - confusion, headaches, seizures or weakness  Derm: negative for - dry skin, hair changes, rash or skin lesion changes    Objective:  Vitals:    09/06/17 1659   BP: 115/71   Pulse: 76   Resp: 17   Temp: 97.3 °F (36.3 °C)   TempSrc: Oral   SpO2: 97%   Weight: 185 lb 12.8 oz (84.3 kg)   Height: 5' 5\" (1.651 m)   PainSc:   0 - No pain     PE    Alert, well appearing, and in no distress, oriented to person, place, and time and overweight  Mental status - alert, oriented to person, place, and time, normal mood, behavior, speech, dress, motor activity, and thought processes  Chest - clear to auscultation, no wheezes, rales or rhonchi, symmetric air entry  Heart - normal rate, regular rhythm, normal S1, S2, no murmurs, rubs, clicks or gallops  Extremities - peripheral pulses normal, no pedal edema, no clubbing or cyanosis    [facility] Outpatient Visit on 03/30/2017   Component Date Value Ref Range Status    LIPID PROFILE 03/30/2017        Final    Cholesterol, total 03/30/2017 165  <200 MG/DL Final    Triglyceride 03/30/2017 107  <150 MG/DL Final    Comment: The drugs N-acetylcysteine (NAC) and  Metamiszole have been found to cause falsely  low results in this chemical assay. Please  be sure to submit blood samples obtained  BEFORE administration of either of these  drugs to assure correct results.  HDL Cholesterol 03/30/2017 43  40 - 60 MG/DL Final    LDL, calculated 03/30/2017 100.6* 0 - 100 MG/DL Final    VLDL, calculated 03/30/2017 21.4  MG/DL Final    CHOL/HDL Ratio 03/30/2017 3.8  0 - 5.0   Final    Glucose 03/30/2017 99  74 - 99 MG/DL Final    The ADA definition of diabetes is a fasting plasma glucOSE (FPG) of 126 mg/dL or higher. The ADA definition of impaired fasting glucose is a FPG of 100-125 mg/dL. TESTS  Results for orders placed or performed during the hospital encounter of 03/30/17   LIPID PANEL   Result Value Ref Range    LIPID PROFILE          Cholesterol, total 165 <200 MG/DL    Triglyceride 107 <150 MG/DL    HDL Cholesterol 43 40 - 60 MG/DL    LDL, calculated 100.6 (H) 0 - 100 MG/DL    VLDL, calculated 21.4 MG/DL    CHOL/HDL Ratio 3.8 0 - 5.0     GLUCOSE, FASTING   Result Value Ref Range    Glucose 99 74 - 99 MG/DL     Assessment/Plan:     1. Prediabetes/high chol - Stop lipitor for now due to possible allergic reaction. Plan is to just check   - LIPID PANEL; Future  - METABOLIC PANEL, COMPREHENSIVE; Future  - HEMOGLOBIN A1C WITH EAG; Future    Lab review: orders written for new lab studies as appropriate; see orders. We will call for results and further plan. I have discussed the diagnosis with the patient and the intended plan as seen in the above orders. The patient has received an after-visit summary and questions were answered concerning future plans. I have discussed medication side effects and warnings with the patient as well. I have reviewed the plan of care with the patient, accepted their input and they are in agreement with the treatment goals. Follow-up Disposition:  Return in about 3 months (around 12/6/2017), or if symptoms worsen or fail to improve.     Dereje Rea MD Opt out